# Patient Record
Sex: FEMALE | Race: WHITE | NOT HISPANIC OR LATINO | Employment: OTHER | URBAN - METROPOLITAN AREA
[De-identification: names, ages, dates, MRNs, and addresses within clinical notes are randomized per-mention and may not be internally consistent; named-entity substitution may affect disease eponyms.]

---

## 2016-12-27 LAB — HM COLONOSCOPY: NORMAL

## 2017-01-12 ENCOUNTER — GENERIC CONVERSION - ENCOUNTER (OUTPATIENT)
Dept: OTHER | Facility: OTHER | Age: 72
End: 2017-01-12

## 2017-03-28 ENCOUNTER — GENERIC CONVERSION - ENCOUNTER (OUTPATIENT)
Dept: OTHER | Facility: OTHER | Age: 72
End: 2017-03-28

## 2017-07-05 ENCOUNTER — GENERIC CONVERSION - ENCOUNTER (OUTPATIENT)
Dept: OTHER | Facility: OTHER | Age: 72
End: 2017-07-05

## 2017-07-06 DIAGNOSIS — E04.1 NONTOXIC SINGLE THYROID NODULE: ICD-10-CM

## 2017-07-06 DIAGNOSIS — I10 ESSENTIAL (PRIMARY) HYPERTENSION: ICD-10-CM

## 2017-07-06 DIAGNOSIS — E78.5 HYPERLIPIDEMIA: ICD-10-CM

## 2017-07-18 ENCOUNTER — GENERIC CONVERSION - ENCOUNTER (OUTPATIENT)
Dept: OTHER | Facility: OTHER | Age: 72
End: 2017-07-18

## 2017-07-26 ENCOUNTER — ALLSCRIPTS OFFICE VISIT (OUTPATIENT)
Dept: OTHER | Facility: OTHER | Age: 72
End: 2017-07-26

## 2017-08-02 ENCOUNTER — GENERIC CONVERSION - ENCOUNTER (OUTPATIENT)
Dept: OTHER | Facility: OTHER | Age: 72
End: 2017-08-02

## 2017-08-08 ENCOUNTER — GENERIC CONVERSION - ENCOUNTER (OUTPATIENT)
Dept: OTHER | Facility: OTHER | Age: 72
End: 2017-08-08

## 2017-08-08 LAB — HM MAMMOGRAM: NORMAL

## 2017-10-23 ENCOUNTER — GENERIC CONVERSION - ENCOUNTER (OUTPATIENT)
Dept: OTHER | Facility: OTHER | Age: 72
End: 2017-10-23

## 2018-01-14 VITALS
SYSTOLIC BLOOD PRESSURE: 170 MMHG | DIASTOLIC BLOOD PRESSURE: 90 MMHG | WEIGHT: 180 LBS | RESPIRATION RATE: 16 BRPM | HEIGHT: 60 IN | BODY MASS INDEX: 35.34 KG/M2 | OXYGEN SATURATION: 98 % | HEART RATE: 90 BPM

## 2018-01-18 NOTE — RESULT NOTES
Verified Results  (1) TSH [id number] 12:00AM Shantell Oconnellselene   [facility]     Test Name Result Flag Reference   TSH 1 820       (1) T4, FREE [id number] 12:00AM Kailey Soares 16     Test Name Result Flag Reference   T4,FREE 1 13

## 2018-01-24 DIAGNOSIS — K21.9 GASTROESOPHAGEAL REFLUX DISEASE, ESOPHAGITIS PRESENCE NOT SPECIFIED: Primary | ICD-10-CM

## 2018-01-24 RX ORDER — OMEPRAZOLE 20 MG/1
20 CAPSULE, DELAYED RELEASE ORAL DAILY
Qty: 30 CAPSULE | Refills: 2 | Status: SHIPPED | OUTPATIENT
Start: 2018-01-24 | End: 2018-01-29 | Stop reason: SDUPTHER

## 2018-01-29 DIAGNOSIS — K21.9 GASTROESOPHAGEAL REFLUX DISEASE, ESOPHAGITIS PRESENCE NOT SPECIFIED: ICD-10-CM

## 2018-01-29 RX ORDER — OMEPRAZOLE 20 MG/1
20 CAPSULE, DELAYED RELEASE ORAL DAILY
Qty: 30 CAPSULE | Refills: 0 | Status: SHIPPED | OUTPATIENT
Start: 2018-01-29 | End: 2018-02-28 | Stop reason: SDUPTHER

## 2018-02-27 DIAGNOSIS — K21.9 GASTROESOPHAGEAL REFLUX DISEASE, ESOPHAGITIS PRESENCE NOT SPECIFIED: ICD-10-CM

## 2018-02-28 DIAGNOSIS — I10 HYPERTENSION, UNSPECIFIED TYPE: Primary | ICD-10-CM

## 2018-02-28 DIAGNOSIS — E78.2 MIXED HYPERLIPIDEMIA: ICD-10-CM

## 2018-02-28 DIAGNOSIS — E04.1 THYROID NODULE: ICD-10-CM

## 2018-02-28 DIAGNOSIS — K21.9 GASTROESOPHAGEAL REFLUX DISEASE, ESOPHAGITIS PRESENCE NOT SPECIFIED: ICD-10-CM

## 2018-02-28 RX ORDER — OMEPRAZOLE 20 MG/1
20 CAPSULE, DELAYED RELEASE ORAL DAILY
Qty: 30 CAPSULE | Refills: 0 | Status: SHIPPED | OUTPATIENT
Start: 2018-02-28 | End: 2018-03-06 | Stop reason: SDUPTHER

## 2018-02-28 RX ORDER — OMEPRAZOLE 20 MG/1
CAPSULE, DELAYED RELEASE ORAL
Qty: 30 CAPSULE | Refills: 0 | OUTPATIENT
Start: 2018-02-28

## 2018-02-28 NOTE — TELEPHONE ENCOUNTER
Approve--30day supply    Labs way overdue--labs into [facility] pt is aware no refills until these are done--was told to pt 07/2017, still not done

## 2018-03-06 DIAGNOSIS — K21.9 GASTROESOPHAGEAL REFLUX DISEASE, ESOPHAGITIS PRESENCE NOT SPECIFIED: ICD-10-CM

## 2018-03-06 RX ORDER — OMEPRAZOLE 20 MG/1
20 CAPSULE, DELAYED RELEASE ORAL DAILY
Qty: 90 CAPSULE | Refills: 1 | Status: SHIPPED | OUTPATIENT
Start: 2018-03-06 | End: 2018-07-30 | Stop reason: SDUPTHER

## 2018-03-10 PROBLEM — I44.7 LEFT BUNDLE-BRANCH BLOCK: Status: ACTIVE | Noted: 2017-07-26

## 2018-03-13 ENCOUNTER — OFFICE VISIT (OUTPATIENT)
Dept: FAMILY MEDICINE CLINIC | Facility: CLINIC | Age: 73
End: 2018-03-13
Payer: MEDICARE

## 2018-03-13 VITALS
HEIGHT: 60 IN | SYSTOLIC BLOOD PRESSURE: 140 MMHG | OXYGEN SATURATION: 97 % | BODY MASS INDEX: 35.14 KG/M2 | HEART RATE: 74 BPM | DIASTOLIC BLOOD PRESSURE: 70 MMHG | WEIGHT: 179 LBS | RESPIRATION RATE: 16 BRPM

## 2018-03-13 DIAGNOSIS — K21.9 GASTROESOPHAGEAL REFLUX DISEASE WITHOUT ESOPHAGITIS: Primary | ICD-10-CM

## 2018-03-13 DIAGNOSIS — J30.89 CHRONIC NONSEASONAL ALLERGIC RHINITIS DUE TO POLLEN: ICD-10-CM

## 2018-03-13 DIAGNOSIS — I10 ESSENTIAL HYPERTENSION: ICD-10-CM

## 2018-03-13 DIAGNOSIS — E78.5 MILD HYPERLIPIDEMIA: ICD-10-CM

## 2018-03-13 PROCEDURE — 99214 OFFICE O/P EST MOD 30 MIN: CPT | Performed by: FAMILY MEDICINE

## 2018-03-13 RX ORDER — HYDROCHLOROTHIAZIDE 12.5 MG/1
12.5 TABLET ORAL DAILY
COMMUNITY
Start: 2018-01-05 | End: 2020-05-08 | Stop reason: ALTCHOICE

## 2018-03-13 NOTE — PATIENT INSTRUCTIONS
Continue current medications  No refills are needed to be sent today  Blood work on cholesterol should be 1 year after the last   Follow up with Endocrinology and Cardiology as they advise  Treat episodic heartburn or GERD symptoms with antacids as we discussed  If this becomes more persistent we could add Zantac or Pepcid in the evening

## 2018-03-13 NOTE — PROGRESS NOTES
Assessment/Plan:    No problem-specific Assessment & Plan notes found for this encounter  Diagnoses and all orders for this visit:    Gastroesophageal reflux disease without esophagitis    Chronic nonseasonal allergic rhinitis due to pollen    Essential hypertension    Mild hyperlipidemia    Other orders  -     aspirin 81 MG tablet; Take 1 tablet by mouth daily  -     hydrochlorothiazide (HYDRODIURIL) 12 5 mg tablet;         Patient Instructions   Continue current medications  No refills are needed to be sent today  Blood work on cholesterol should be 1 year after the last   Follow up with Endocrinology and Cardiology as they advise  Treat episodic heartburn or GERD symptoms with antacids as we discussed  If this becomes more persistent we could add Zantac or Pepcid in the evening  Subjective:   Chief Complaint   Patient presents with    Follow-up     mm - review labs, reflux issues           Patient ID: Candy Baron is a 67 y o  female  Medical management  1) GERD- some episodic symptoms- usually resolved with prn antacids- continues with Omeprazole in the AM  2)HTN/LBBB- sees cardiology who manages meds  3) Thyroid nodule- being followed by Dr Benjamin Scheuermann  4) Hyperlipidemia- stable with current meds- no med side effects        The following portions of the patient's history were reviewed and updated as appropriate: allergies, current medications, past family history, past medical history, past social history and problem list     Review of Systems   Constitutional: Negative for activity change, appetite change, diaphoresis and fatigue  Respiratory: Negative for cough, chest tightness, shortness of breath and wheezing  Cardiovascular: Negative for chest pain, palpitations and leg swelling  Fast or slow heart rate   Gastrointestinal: Negative for abdominal pain, blood in stool, constipation, diarrhea, nausea and vomiting     Genitourinary: Negative for difficulty urinating, dysuria and frequency  Musculoskeletal: Negative for arthralgias, gait problem, joint swelling and myalgias  Neurological: Negative for dizziness, weakness, light-headedness, numbness and headaches  Psychiatric/Behavioral: Negative for agitation, confusion, dysphoric mood and sleep disturbance  The patient is not nervous/anxious  Objective:      /70 (BP Location: Left arm, Patient Position: Sitting, Cuff Size: Standard)   Pulse 74   Resp 16   Ht 5' (1 524 m)   Wt 81 2 kg (179 lb)   SpO2 97%   BMI 34 96 kg/m²          Physical Exam   Constitutional: She is oriented to person, place, and time  She appears well-developed and well-nourished  No distress  HENT:   Head: Normocephalic and atraumatic  Right Ear: Tympanic membrane, external ear and ear canal normal    Left Ear: Tympanic membrane, external ear and ear canal normal    Nose: No mucosal edema or rhinorrhea  Right sinus exhibits no maxillary sinus tenderness  Left sinus exhibits no maxillary sinus tenderness  Mouth/Throat: Uvula is midline  Mucous membranes are not pale and not dry  Normal dentition  No oropharyngeal exudate  Eyes: EOM are normal  Pupils are equal, round, and reactive to light  Right eye exhibits no discharge  Left eye exhibits no discharge  Neck: Normal range of motion  Neck supple  No thyromegaly present  Cardiovascular: Normal rate, regular rhythm, normal heart sounds and intact distal pulses  No murmur heard  Pulmonary/Chest: Effort normal and breath sounds normal  No respiratory distress  She has no wheezes  She has no rales  Musculoskeletal: Normal range of motion  She exhibits no edema or tenderness  Lymphadenopathy:     She has no cervical adenopathy  Neurological: She is alert and oriented to person, place, and time  No cranial nerve deficit  Skin: She is not diaphoretic  Psychiatric: She has a normal mood and affect   Her behavior is normal

## 2018-05-04 ENCOUNTER — TELEPHONE (OUTPATIENT)
Dept: ENDOCRINOLOGY | Facility: HOSPITAL | Age: 73
End: 2018-05-04

## 2018-05-04 ENCOUNTER — OFFICE VISIT (OUTPATIENT)
Dept: ENDOCRINOLOGY | Facility: HOSPITAL | Age: 73
End: 2018-05-04
Payer: MEDICARE

## 2018-05-04 VITALS
HEART RATE: 78 BPM | DIASTOLIC BLOOD PRESSURE: 66 MMHG | HEIGHT: 60 IN | BODY MASS INDEX: 35.34 KG/M2 | WEIGHT: 180 LBS | SYSTOLIC BLOOD PRESSURE: 120 MMHG

## 2018-05-04 DIAGNOSIS — E04.2 GOITER, NONTOXIC, MULTINODULAR: ICD-10-CM

## 2018-05-04 DIAGNOSIS — E04.1 THYROID NODULE: Primary | ICD-10-CM

## 2018-05-04 PROCEDURE — 99213 OFFICE O/P EST LOW 20 MIN: CPT | Performed by: INTERNAL MEDICINE

## 2018-05-04 NOTE — LETTER
May 4, 2018     Rodríguez Pal MD  [facility]    Patient: Luis Billings   YOB: 1945   Date of Visit: 5/4/2018       Dear Dr Eunice Dalal:    Thank you for referring Cici Hwang to me for evaluation  Below are my notes for this consultation  If you have questions, please do not hesitate to call me  I look forward to following your patient along with you  Sincerely,        Azam Villa MD        CC: No Recipients  Azam Villa MD  5/4/2018 11:41 AM  Sign at close encounter  5/4/2018    Assessment/Plan      {Assess/PlanSmartLinks:69668}    Assessment/Plan:  ***      CC: ***    History of Present Illness     HPI: Luis Billings is a 67y o  year old female with history of ***    Review of Systems   Constitutional: Negative for fatigue and unexpected weight change  Weight a few lbs less  HENT: Negative for ear pain, hearing loss, tinnitus and trouble swallowing  No XRT to head or neck  Eyes: Negative for visual disturbance  No diplopia  Respiratory: Negative for chest tightness and shortness of breath  Cardiovascular: Negative for chest pain, palpitations and leg swelling  Gastrointestinal: Positive for diarrhea  Negative for abdominal pain, constipation and nausea  Occasional diarrhea  Endocrine: Negative for cold intolerance and heat intolerance  Musculoskeletal: Positive for arthralgias  Negative for back pain  Bilateral knee pain  Still some left knee stiffness  Skin: Negative for rash and wound  No dry skin or hair loss, but has brittle nails at times   Neurological: Negative for dizziness, tremors, light-headedness, numbness and headaches  Numbness left arm in am after sleep on it  Psychiatric/Behavioral: Negative for dysphoric mood and sleep disturbance  The patient is not nervous/anxious          Historical Information   Past Medical History:   Diagnosis Date    Cataract     OS    GERD (gastroesophageal reflux disease)     Hyperlipidemia     Hypertension     Seasonal allergies      Past Surgical History:   Procedure Laterality Date    APPENDECTOMY      ASPIRATION BIOPSY      thyroid    CATARACT EXTRACTION W/  INTRAOCULAR LENS IMPLANT Right     CHOLECYSTECTOMY      HYSTERECTOMY      total    WI REMV CATARACT EXTRACAP,INSERT LENS Left 10/19/2016    Procedure: OS EXTRACTION EXTRACAPSULAR CATARACT PHACO INTRAOCULAR LENS (IOL); Surgeon: Nilo Zapien MD;  Location:  MAIN OR;  Service: Ophthalmology    TOTAL KNEE ARTHROPLASTY Left     VAGINAL DELIVERY      X 4     Social History   History   Alcohol Use    Yes     Comment: twice monthly     History   Drug Use No     History   Smoking Status    Never Smoker   Smokeless Tobacco    Never Used     Family History:   Family History   Problem Relation Age of Onset    Coronary artery disease Mother     Other Mother      arterioloscleroses    Tuberculosis Father     Cancer Sister     Early death Sister     Cancer Brother     Early death Brother     Colon cancer Brother     Drug abuse Grandchild     Alcohol abuse Son     Drug abuse Cousin        Meds/Allergies   Current Outpatient Prescriptions   Medication Sig Dispense Refill    aspirin 81 MG tablet Take 1 tablet by mouth daily      Cholecalciferol (VITAMIN D3) 1000 UNITS CAPS Take by mouth      hydrochlorothiazide (HYDRODIURIL) 12 5 mg tablet       lisinopril (ZESTRIL) 10 mg tablet Take 10 mg by mouth daily 10mg in AM 20mg at night       loratadine (CLARITIN) 10 mg tablet Take 10 mg by mouth daily      Omega-3 Fatty Acids (OMEGA 3 PO) Take 350 mg by mouth daily      omeprazole (PriLOSEC) 20 mg delayed release capsule Take 1 capsule (20 mg total) by mouth daily 90 capsule 1    simvastatin (ZOCOR) 20 mg tablet Take 20 mg by mouth daily at bedtime       No current facility-administered medications for this visit        No Known Allergies    Objective   Vitals: Blood pressure 120/66, pulse 78, height 5' (1 524 m), weight 81 6 kg (180 lb)  Invasive Devices          No matching active lines, drains, or airways          Physical Exam   Constitutional: She is oriented to person, place, and time  She appears well-developed and well-nourished  HENT:   Head: Normocephalic and atraumatic  Eyes: Conjunctivae and EOM are normal  Pupils are equal, round, and reactive to light  Neck: Normal range of motion  Neck supple  No thyromegaly present  Cardiovascular: Normal rate, regular rhythm, normal heart sounds and intact distal pulses  No murmur heard  Pulmonary/Chest: Effort normal and breath sounds normal  She has no wheezes  Abdominal: Soft  Bowel sounds are normal  There is no tenderness  Musculoskeletal: Normal range of motion  She exhibits no edema or deformity  Lymphadenopathy:     She has no cervical adenopathy  Neurological: She is alert and oriented to person, place, and time  She has normal reflexes  Skin: Skin is warm and dry  No rash noted  Vitals reviewed  The history was obtained from the review of the chart and from the {BBPTFA:76850}  Lab Results:        Lab Results   Component Value Date    CHOL 180 08/14/2015    HDL 69 08/14/2015    TRIG 137 08/14/2015           Recent Results (from the past 49002 hour(s))    Mammography    Collection Time: 08/08/17 12:00 AM   Result Value Ref Range     Mammogram [facility]          No future appointments

## 2018-05-04 NOTE — PATIENT INSTRUCTIONS
Thyroid blood work normal   We'll get ultrasound report and anthony you with results  Follow up in 1 year with blood work

## 2018-05-04 NOTE — LETTER
May 4, 2018     Rodríguez Pal MD  [facility]    Patient: Luis Billings   YOB: 1945   Date of Visit: 5/4/2018       Dear Dr Eunice Dalal:    Thank you for referring iCci Hwang to me for evaluation  Below are my notes for this consultation  If you have questions, please do not hesitate to call me  I look forward to following your patient along with you  Sincerely,        Azam Villa MD        CC: No Recipients  Azam Villa MD  5/4/2018 12:32 PM  Sign at close encounter  5/4/2018    Assessment/Plan      Diagnoses and all orders for this visit:    Thyroid nodule  -     Thyroid Antibodies Panel Lab Collect; Future  -     TSH, 3rd generation Lab Collect; Future    Goiter, nontoxic, multinodular  -     Thyroid Antibodies Panel Lab Collect; Future  -     TSH, 3rd generation Lab Collect; Future        Assessment/Plan:  1  Multinodular goiter  Most recent thyroid tests show her TSH is normal   She is biochemically and clinically euthyroid  I will follow this level over time  2   Left-sided dominant thyroid nodule  This nodule had previously been biopsied and was benign  It is actually decreased in size since last year  We will follow this with serial ultrasounds and likely repeat an ultrasound in about 2 years  I will see her in 1 year with preceding TSH and thyroid antibodies  CC: thyroid consult    History of Present Illness     HPI: Luis Billings is a 67y o  year old female with history of nontoxic multinodular goiter with left-sided dominant thyroid nodule biopsy benign in September of 2016  These nodules were originally found to on routine physical exam when her thyroid was felt to be abnormal in 2016 an ultrasound showed the nodules  She is on no medications at this time  She denies heat or cold intolerance  She denies constipation, palpitation, tremors, insomnia, or anxiety  She has some intermittent diarrhea certain foods    She has no fatigue  She has lost about 5 lb since last year  She has brittle nails at times, but no dry skin or hair loss  She has no diplopia  She has no compressive thyroid symptoms  She has no history of head or neck irradiation  Review of Systems   Constitutional: Negative for fatigue and unexpected weight change  Weight a few lbs less  HENT: Negative for ear pain, hearing loss, tinnitus and trouble swallowing  No XRT to head or neck  Eyes: Negative for visual disturbance  No diplopia  Respiratory: Negative for chest tightness and shortness of breath  Cardiovascular: Negative for chest pain, palpitations and leg swelling  Gastrointestinal: Positive for diarrhea  Negative for abdominal pain, constipation and nausea  Occasional diarrhea  Endocrine: Negative for cold intolerance and heat intolerance  Musculoskeletal: Positive for arthralgias  Negative for back pain  Bilateral knee pain  Still some left knee stiffness  Skin: Negative for rash and wound  No dry skin or hair loss, but has brittle nails at times   Neurological: Negative for dizziness, tremors, light-headedness, numbness and headaches  Numbness left arm in am after sleep on it  Psychiatric/Behavioral: Negative for dysphoric mood and sleep disturbance  The patient is not nervous/anxious  Historical Information   Past Medical History:   Diagnosis Date    Cataract     OS    GERD (gastroesophageal reflux disease)     Hyperlipidemia     Hypertension     Seasonal allergies      Past Surgical History:   Procedure Laterality Date    APPENDECTOMY      ASPIRATION BIOPSY      thyroid    CATARACT EXTRACTION W/  INTRAOCULAR LENS IMPLANT Right     CHOLECYSTECTOMY      HYSTERECTOMY      total    TX REMV CATARACT EXTRACAP,INSERT LENS Left 10/19/2016    Procedure: OS EXTRACTION EXTRACAPSULAR CATARACT PHACO INTRAOCULAR LENS (IOL);   Surgeon: Jurgen Scott MD;  Location:  MAIN OR; Service: Ophthalmology    TOTAL KNEE ARTHROPLASTY Left     VAGINAL DELIVERY      X 4     Social History   History   Alcohol Use    Yes     Comment: twice monthly     History   Drug Use No     History   Smoking Status    Never Smoker   Smokeless Tobacco    Never Used     Family History:   Family History   Problem Relation Age of Onset    Coronary artery disease Mother     Other Mother      arterioloscleroses    Tuberculosis Father     Cancer Sister     Early death Sister     Colon cancer Sister     Cancer Brother     Early death Brother     Colon cancer Brother     Drug abuse Grandchild     Alcohol abuse Son     Drug abuse Cousin     No Known Problems Son     No Known Problems Daughter     No Known Problems Daughter        Meds/Allergies   Current Outpatient Prescriptions   Medication Sig Dispense Refill    aspirin 81 MG tablet Take 1 tablet by mouth daily      Cholecalciferol (VITAMIN D3) 1000 UNITS CAPS Take by mouth daily        hydrochlorothiazide (HYDRODIURIL) 12 5 mg tablet Take 12 5 mg by mouth daily        lisinopril (ZESTRIL) 10 mg tablet Take 10 mg by mouth daily 20mg in AM 10mg at night       loratadine (CLARITIN) 10 mg tablet Take 10 mg by mouth daily      Omega-3 Fatty Acids (OMEGA 3 PO) Take 350 mg by mouth daily      omeprazole (PriLOSEC) 20 mg delayed release capsule Take 1 capsule (20 mg total) by mouth daily 90 capsule 1    simvastatin (ZOCOR) 20 mg tablet Take 20 mg by mouth daily at bedtime       No current facility-administered medications for this visit  No Known Allergies    Objective   Vitals: Blood pressure 120/66, pulse 78, height 5' (1 524 m), weight 81 6 kg (180 lb)  Invasive Devices          No matching active lines, drains, or airways          Physical Exam   Constitutional: She is oriented to person, place, and time  She appears well-developed and well-nourished  HENT:   Head: Normocephalic and atraumatic     Eyes: Conjunctivae and EOM are normal  Pupils are equal, round, and reactive to light  No lid lag, stare, proptosis, or periorbital edema  Neck: Normal range of motion  Neck supple  No thyromegaly present  Thyroid irregular in feel without discrete nodule palpable  No carotid bruits  Cardiovascular: Normal rate, regular rhythm, normal heart sounds and intact distal pulses  No murmur heard  Pulmonary/Chest: Effort normal and breath sounds normal  She has no wheezes  Abdominal: Soft  Bowel sounds are normal  There is no tenderness  Musculoskeletal: Normal range of motion  She exhibits edema  She exhibits no deformity  No tremor of the outstretched hands  No spinous process tenderness  No CVAT  Trace edema of the left lower leg  Lymphadenopathy:     She has no cervical adenopathy  Neurological: She is alert and oriented to person, place, and time  She has normal reflexes  Skin: Skin is warm and dry  No rash noted  Vitals reviewed  The history was obtained from the review of the chart and from the patient  Lab Results:    Blood work done on 03/28/2018 at [address] [place] [address] showed a TSH of 2 02  Thyroid ultrasound done on 03/02/2018 done at [address] [place] [address] showed a right lobe of the thyroid 4 1 cm in height and the left lobe of the thyroid 3 8 cm in height  This ultrasound was compared to a previous ultrasound on March 17, 2017  There was a posterior peripheral lower pole right thyroid hypoechoic nodule at 1 cm that is a mm larger  There is a 9 mm nodule in the isthmus that is 1 mm smaller  There is an anterior midpole left nodule 6 mm unchanged in size  There is a lower pole left thyroid nodule 1 2 cm and isoechoic which is diminished in size from 2 2 cm      Lab Results   Component Value Date    CHOL 180 08/14/2015    HDL 69 08/14/2015    TRIG 137 08/14/2015           Recent Results (from the past 53094 hour(s))    Mammography    Collection Time: 08/08/17 12:00 AM   Result Value Ref Range     Mammogram [facility] Future Appointments  Date Time Provider Caitlin Ross   5/7/2019 10:00 AM Chyna Mayo MD [facility]

## 2018-05-04 NOTE — PROGRESS NOTES
5/4/2018    Assessment/Plan      Diagnoses and all orders for this visit:    Thyroid nodule  -     Thyroid Antibodies Panel Lab Collect; Future  -     TSH, 3rd generation Lab Collect; Future    Goiter, nontoxic, multinodular  -     Thyroid Antibodies Panel Lab Collect; Future  -     TSH, 3rd generation Lab Collect; Future        Assessment/Plan:  1  Multinodular goiter  Most recent thyroid tests show her TSH is normal   She is biochemically and clinically euthyroid  I will follow this level over time  2   Left-sided dominant thyroid nodule  This nodule had previously been biopsied and was benign  It is actually decreased in size since last year  We will follow this with serial ultrasounds and likely repeat an ultrasound in about 2 years  I will see her in 1 year with preceding TSH and thyroid antibodies  CC: thyroid consult    History of Present Illness     HPI: Aurelio Teran is a 67y o  year old female with history of nontoxic multinodular goiter with left-sided dominant thyroid nodule biopsy benign in September of 2016  These nodules were originally found to on routine physical exam when her thyroid was felt to be abnormal in 2016 an ultrasound showed the nodules  She is on no medications at this time  She denies heat or cold intolerance  She denies constipation, palpitation, tremors, insomnia, or anxiety  She has some intermittent diarrhea certain foods  She has no fatigue  She has lost about 5 lb since last year  She has brittle nails at times, but no dry skin or hair loss  She has no diplopia  She has no compressive thyroid symptoms  She has no history of head or neck irradiation  Review of Systems   Constitutional: Negative for fatigue and unexpected weight change  Weight a few lbs less  HENT: Negative for ear pain, hearing loss, tinnitus and trouble swallowing  No XRT to head or neck  Eyes: Negative for visual disturbance  No diplopia     Respiratory: Negative for chest tightness and shortness of breath  Cardiovascular: Negative for chest pain, palpitations and leg swelling  Gastrointestinal: Positive for diarrhea  Negative for abdominal pain, constipation and nausea  Occasional diarrhea  Endocrine: Negative for cold intolerance and heat intolerance  Musculoskeletal: Positive for arthralgias  Negative for back pain  Bilateral knee pain  Still some left knee stiffness  Skin: Negative for rash and wound  No dry skin or hair loss, but has brittle nails at times   Neurological: Negative for dizziness, tremors, light-headedness, numbness and headaches  Numbness left arm in am after sleep on it  Psychiatric/Behavioral: Negative for dysphoric mood and sleep disturbance  The patient is not nervous/anxious  Historical Information   Past Medical History:   Diagnosis Date    Cataract     OS    GERD (gastroesophageal reflux disease)     Hyperlipidemia     Hypertension     Seasonal allergies      Past Surgical History:   Procedure Laterality Date    APPENDECTOMY      ASPIRATION BIOPSY      thyroid    CATARACT EXTRACTION W/  INTRAOCULAR LENS IMPLANT Right     CHOLECYSTECTOMY      HYSTERECTOMY      total    MD REMV CATARACT EXTRACAP,INSERT LENS Left 10/19/2016    Procedure: OS EXTRACTION EXTRACAPSULAR CATARACT PHACO INTRAOCULAR LENS (IOL);   Surgeon: Fortino Chopra MD;  Location: [facility] OR;  Service: Ophthalmology    TOTAL KNEE ARTHROPLASTY Left     VAGINAL DELIVERY      X 4     Social History   History   Alcohol Use    Yes     Comment: twice monthly     History   Drug Use No     History   Smoking Status    Never Smoker   Smokeless Tobacco    Never Used     Family History:   Family History   Problem Relation Age of Onset    Coronary artery disease Mother     Other Mother      arterioloscleroses    Tuberculosis Father     Cancer Sister     Early death Sister     Colon cancer Sister     Cancer Brother     Early death Brother     Colon cancer Brother     Drug abuse Grandchild     Alcohol abuse Son     Drug abuse Cousin     No Known Problems Son     No Known Problems Daughter     No Known Problems Daughter        Meds/Allergies   Current Outpatient Prescriptions   Medication Sig Dispense Refill    aspirin 81 MG tablet Take 1 tablet by mouth daily      Cholecalciferol (VITAMIN D3) 1000 UNITS CAPS Take by mouth daily        hydrochlorothiazide (HYDRODIURIL) 12 5 mg tablet Take 12 5 mg by mouth daily        lisinopril (ZESTRIL) 10 mg tablet Take 10 mg by mouth daily 20mg in AM 10mg at night       loratadine (CLARITIN) 10 mg tablet Take 10 mg by mouth daily      Omega-3 Fatty Acids (OMEGA 3 PO) Take 350 mg by mouth daily      omeprazole (PriLOSEC) 20 mg delayed release capsule Take 1 capsule (20 mg total) by mouth daily 90 capsule 1    simvastatin (ZOCOR) 20 mg tablet Take 20 mg by mouth daily at bedtime       No current facility-administered medications for this visit  No Known Allergies    Objective   Vitals: Blood pressure 120/66, pulse 78, height 5' (1 524 m), weight 81 6 kg (180 lb)  Invasive Devices          No matching active lines, drains, or airways          Physical Exam   Constitutional: She is oriented to person, place, and time  She appears well-developed and well-nourished  HENT:   Head: Normocephalic and atraumatic  Eyes: Conjunctivae and EOM are normal  Pupils are equal, round, and reactive to light  No lid lag, stare, proptosis, or periorbital edema  Neck: Normal range of motion  Neck supple  No thyromegaly present  Thyroid irregular in feel without discrete nodule palpable  No carotid bruits  Cardiovascular: Normal rate, regular rhythm, normal heart sounds and intact distal pulses  No murmur heard  Pulmonary/Chest: Effort normal and breath sounds normal  She has no wheezes  Abdominal: Soft  Bowel sounds are normal  There is no tenderness     Musculoskeletal: Normal range of motion  She exhibits edema  She exhibits no deformity  No tremor of the outstretched hands  No spinous process tenderness  No CVAT  Trace edema of the left lower leg  Lymphadenopathy:     She has no cervical adenopathy  Neurological: She is alert and oriented to person, place, and time  She has normal reflexes  Skin: Skin is warm and dry  No rash noted  Vitals reviewed  The history was obtained from the review of the chart and from the patient  Lab Results:    Blood work done on 03/28/2018 at [facility] showed a TSH of 2 02  Thyroid ultrasound done on 03/02/2018 done at [facility] showed a right lobe of the thyroid 4 1 cm in height and the left lobe of the thyroid 3 8 cm in height  This ultrasound was compared to a previous ultrasound on March 17, 2017  There was a posterior peripheral lower pole right thyroid hypoechoic nodule at 1 cm that is a mm larger  There is a 9 mm nodule in the isthmus that is 1 mm smaller  There is an anterior midpole left nodule 6 mm unchanged in size  There is a lower pole left thyroid nodule 1 2 cm and isoechoic which is diminished in size from 2 2 cm      Lab Results   Component Value Date    CHOL 180 08/14/2015    HDL 69 08/14/2015    TRIG 137 08/14/2015           Recent Results (from the past 88695 hour(s))    Mammography    Collection Time: 08/08/17 12:00 AM   Result Value Ref Range     Mammogram [facility]          Future Appointments  Date Time Provider Caitlin Ross   5/7/2019 10:00 AM Adair Godinez MD [facility]

## 2018-07-30 DIAGNOSIS — K21.9 GASTROESOPHAGEAL REFLUX DISEASE, ESOPHAGITIS PRESENCE NOT SPECIFIED: ICD-10-CM

## 2018-07-31 RX ORDER — OMEPRAZOLE 20 MG/1
20 CAPSULE, DELAYED RELEASE ORAL DAILY
Qty: 90 CAPSULE | Refills: 1 | Status: SHIPPED | OUTPATIENT
Start: 2018-07-31 | End: 2019-03-07 | Stop reason: ALTCHOICE

## 2018-08-14 DIAGNOSIS — E78.5 MILD HYPERLIPIDEMIA: Primary | ICD-10-CM

## 2018-08-15 RX ORDER — SIMVASTATIN 20 MG
TABLET ORAL
Qty: 90 TABLET | Refills: 2 | Status: SHIPPED | OUTPATIENT
Start: 2018-08-15 | End: 2019-03-07 | Stop reason: ALTCHOICE

## 2018-08-15 NOTE — TELEPHONE ENCOUNTER
PLEASE APPROVE    DUE 6M OV F/U 9/2018 - CALLED AND SPOKE TO TO, SHE WANTS APT BEFORE SEPT    OV SCHED 8/22

## 2018-08-22 ENCOUNTER — OFFICE VISIT (OUTPATIENT)
Dept: FAMILY MEDICINE CLINIC | Facility: CLINIC | Age: 73
End: 2018-08-22
Payer: MEDICARE

## 2018-08-22 VITALS
WEIGHT: 173 LBS | SYSTOLIC BLOOD PRESSURE: 130 MMHG | OXYGEN SATURATION: 96 % | BODY MASS INDEX: 33.96 KG/M2 | HEIGHT: 60 IN | RESPIRATION RATE: 16 BRPM | HEART RATE: 80 BPM | DIASTOLIC BLOOD PRESSURE: 80 MMHG

## 2018-08-22 DIAGNOSIS — R13.14 PHARYNGOESOPHAGEAL DYSPHAGIA: ICD-10-CM

## 2018-08-22 DIAGNOSIS — E78.5 MILD HYPERLIPIDEMIA: ICD-10-CM

## 2018-08-22 DIAGNOSIS — Z00.00 MEDICARE ANNUAL WELLNESS VISIT, SUBSEQUENT: Primary | ICD-10-CM

## 2018-08-22 DIAGNOSIS — M17.12 PRIMARY OSTEOARTHRITIS OF LEFT KNEE: ICD-10-CM

## 2018-08-22 DIAGNOSIS — I10 ESSENTIAL HYPERTENSION: ICD-10-CM

## 2018-08-22 DIAGNOSIS — Z23 FLU VACCINE NEED: ICD-10-CM

## 2018-08-22 DIAGNOSIS — Z11.59 ENCOUNTER FOR HEPATITIS C SCREENING TEST FOR LOW RISK PATIENT: ICD-10-CM

## 2018-08-22 DIAGNOSIS — K21.9 GASTROESOPHAGEAL REFLUX DISEASE WITHOUT ESOPHAGITIS: ICD-10-CM

## 2018-08-22 PROCEDURE — G0008 ADMIN INFLUENZA VIRUS VAC: HCPCS

## 2018-08-22 PROCEDURE — 90662 IIV NO PRSV INCREASED AG IM: CPT

## 2018-08-22 PROCEDURE — 99214 OFFICE O/P EST MOD 30 MIN: CPT | Performed by: FAMILY MEDICINE

## 2018-08-22 PROCEDURE — G0439 PPPS, SUBSEQ VISIT: HCPCS | Performed by: FAMILY MEDICINE

## 2018-08-22 NOTE — PROGRESS NOTES
Assessment/Plan:    Gastroesophageal reflux disease  Continue current meds, refer to GI for evaluation recent onset of dysphagia  Essential hypertension  Continue current meds-repeat labs 6 months  Mild hyperlipidemia  Stable on current medications-no side effect  Repeat lipid panel will be due early next year  Diagnoses and all orders for this visit:    Medicare annual wellness visit, subsequent    Encounter for hepatitis C screening test for low risk patient  -     Hepatitis C antibody; Future    Gastroesophageal reflux disease without esophagitis  -     Ambulatory referral to Gastroenterology; Future    Mild hyperlipidemia    Pharyngoesophageal dysphagia  -     Ambulatory referral to Gastroenterology; Future    Essential hypertension    Primary osteoarthritis of left knee    Flu vaccine need  -     influenza vaccine, [id number], high-dose, PF 0 5 mL, for patients 65 yr+ (FLUZONE HIGH-DOSE)        Medical management-continue current medications  I would refer you to GI for the swallowing disorder  Flu shot today  Other laboratories can be done next February March in be ordered by me or Endocrinology  Subjective:   Chief Complaint   Patient presents with    Follow-up     6m f/u    trouble swallowing        Medicare Wellness Visit     annual         Patient ID: Luis Billings is a 68 y o  female  Medical management-  1) GERD-patient had been stable with medications  Over the last 3-4 weeks she does note some trouble swallowing solid and particularly dry foods  They appear to get stuck in the lower pharyngeal upper esophagus area  She is able to clear this by taking fluids  Does not happen with fluids or yogurt and other softer foods  There is no vomiting reported  No coughing or shortness of breath  2) HTN- stable on meds  3) htperlipidemia-last levels were good  There were done 6 months ago  Tolerating current medications    4) hypothyroidism-last TSH was good, Dr Tyson monitors thyroid function  The following portions of the patient's history were reviewed and updated as appropriate: allergies, current medications, past family history, past medical history, past social history, past surgical history and problem list     Review of Systems   Constitutional: Negative for activity change, appetite change, diaphoresis and fatigue  Respiratory: Positive for choking  Negative for cough, chest tightness, shortness of breath and wheezing  Cardiovascular: Negative for chest pain, palpitations and leg swelling  Fast or slow heart rate   Gastrointestinal: Positive for vomiting  Negative for abdominal pain, blood in stool, constipation, diarrhea and nausea  Genitourinary: Negative for difficulty urinating, dysuria and frequency  Musculoskeletal: Positive for arthralgias  Negative for gait problem, joint swelling and myalgias  Neurological: Negative for dizziness, light-headedness, numbness and headaches  Psychiatric/Behavioral: Negative for agitation, confusion, dysphoric mood and sleep disturbance  The patient is not nervous/anxious  Objective:  Vitals:    08/22/18 0911   BP: 130/80   BP Location: Left arm   Patient Position: Sitting   Cuff Size: Standard   Pulse: 80   Resp: 16   SpO2: 96%   Weight: 78 5 kg (173 lb)   Height: 5' (1 524 m)      Physical Exam   Constitutional: She appears well-developed and well-nourished  No distress  HENT:   Head: Normocephalic and atraumatic  Right Ear: Tympanic membrane and external ear normal  No drainage  Left Ear: Tympanic membrane normal  There is drainage  Mouth/Throat: No oropharyngeal exudate  Eyes: Conjunctivae and EOM are normal  Right eye exhibits no discharge  Left eye exhibits no discharge  Neck: Normal range of motion  Neck supple  No thyromegaly present  Cardiovascular: Normal rate, regular rhythm and normal heart sounds  Pulmonary/Chest: Effort normal  No respiratory distress  She has no wheezes  She has no rales  Abdominal: Soft  She exhibits no mass  There is no tenderness  There is no rebound  Lymphadenopathy:     She has no cervical adenopathy  Skin: Skin is warm and dry  Psychiatric: She has a normal mood and affect   Her behavior is normal

## 2018-08-22 NOTE — PROGRESS NOTES
Assessment and Plan:    Problem List Items Addressed This Visit     Gastroesophageal reflux disease     Continue current meds, refer to GI for evaluation recent onset of dysphagia  Relevant Orders    Ambulatory referral to Gastroenterology    Essential hypertension     Continue current meds-repeat labs 6 months  Mild hyperlipidemia     Stable on current medications-no side effect  Repeat lipid panel will be due early next year  Primary osteoarthritis of left knee      Other Visit Diagnoses     Medicare annual wellness visit, subsequent    -  Primary    Encounter for hepatitis C screening test for low risk patient        Relevant Orders    Hepatitis C antibody    Pharyngoesophageal dysphagia        Relevant Orders    Ambulatory referral to Gastroenterology    Flu vaccine need        Relevant Orders    influenza vaccine, [id number], high-dose, PF 0 5 mL, for patients 65 yr+ (FLUZONE HIGH-DOSE) (Completed)        Health Maintenance Due   Topic Date Due    DXA SCAN  1945    DTaP,Tdap,and Td Vaccines (1 - Tdap) 07/27/1966    Urinary Incontinence Screening  07/27/2010         HPI:  Darin Hunt is a 68 y o  female here for her Subsequent Wellness Visit      Patient Active Problem List   Diagnosis    Allergic rhinitis due to pollen    Gastroesophageal reflux disease    Essential hypertension    Left bundle-branch block    Mild hyperlipidemia    Primary osteoarthritis of left knee    Thyroid nodule    Goiter, nontoxic, multinodular     Past Medical History:   Diagnosis Date    Cataract     OS    GERD (gastroesophageal reflux disease)     Hyperlipidemia     Hypertension     Seasonal allergies      Past Surgical History:   Procedure Laterality Date    APPENDECTOMY      ASPIRATION BIOPSY      thyroid    CATARACT EXTRACTION W/  INTRAOCULAR LENS IMPLANT Right     CHOLECYSTECTOMY      HYSTERECTOMY      total    ND REMV CATARACT EXTRACAP,INSERT LENS Left 10/19/2016    Procedure: OS EXTRACTION EXTRACAPSULAR CATARACT PHACO INTRAOCULAR LENS (IOL); Surgeon: Nilo Zapien MD;  Location: [facility];  Service: Ophthalmology    TOTAL KNEE ARTHROPLASTY Left     VAGINAL DELIVERY      X 4     Family History   Problem Relation Age of Onset    Coronary artery disease Mother     Other Mother         arterioloscleroses    Tuberculosis Father     Cancer Sister     Early death Sister     Colon cancer Sister     Cancer Brother     Early death Brother    Dequan Adan Colon cancer Brother     Drug abuse Grandchild     Alcohol abuse Son     Drug abuse Cousin     No Known Problems Son     No Known Problems Daughter     No Known Problems Daughter      History   Smoking Status    Never Smoker   Smokeless Tobacco    Never Used     History   Alcohol Use    Yes     Comment: twice monthly      History   Drug Use No       Current Outpatient Prescriptions   Medication Sig Dispense Refill    aspirin 81 MG tablet Take 1 tablet by mouth daily      Cholecalciferol (VITAMIN D3) 1000 UNITS CAPS Take by mouth daily        hydrochlorothiazide (HYDRODIURIL) 12 5 mg tablet Take 12 5 mg by mouth daily        lisinopril (ZESTRIL) 10 mg tablet Take 10 mg by mouth daily 20mg in AM 10mg at night       loratadine (CLARITIN) 10 mg tablet Take 10 mg by mouth daily      Omega-3 Fatty Acids (OMEGA 3 PO) Take 350 mg by mouth daily      omeprazole (PriLOSEC) 20 mg delayed release capsule TAKE 1 CAPSULE (20 MG TOTAL) BY MOUTH DAILY 90 capsule 1    simvastatin (ZOCOR) 20 mg tablet TAKE 1 TABLET EVERY DAY 90 tablet 2     No current facility-administered medications for this visit        No Known Allergies  Immunization History   Administered Date(s) Administered    Influenza 09/08/2014    Influenza Split High Dose Preservative Free IM 10/25/2016, 10/04/2017    Influenza, high dose seasonal 0 5 mL 08/22/2018    Pneumococcal Conjugate 13-Valent 03/12/2015    Pneumococcal Polysaccharide PPV23 08/28/2012       Patient Care Team:  Freida Arthur MD as PCP - Jose Juan Abel MD (Endocrinology)    Medicare Screening Tests and Risk Assessments:      Health Risk Assessment:  Patient rates overall health as very good  Patient feels that their physical health rating is Same  Eyesight was rated as Same  Hearing was rated as Same  Patient feels that their emotional and mental health rating is Same  Pain experienced by patient in the last 7 days has been None  Emotional/Mental Health:  Patient has been feeling nervous/anxious  PHQ-9 Depression Screening:    Frequency of the following problems over the past two weeks:      1  Little interest or pleasure in doing things: 0 - not at all      2  Feeling down, depressed, or hopeless: 0 - not at all  PHQ-2 Score: 0          Broken Bones/Falls: Fall Risk Assessment:    In the past year, patient has experienced: No history of falling in past year          Bladder/Bowel:  Patient has not leaked urine accidently in the last six months  Patient reports no loss of bowel control  Immunizations:  Patient has had a flu vaccination within the last year  Patient has received a pneumonia shot  Patient has not received a shingles shot  Patient has not received tetanus/diphtheria shot  Home Safety:  Patient does not have trouble with stairs inside or outside of their home  Patient currently reports that there are no safety hazards present in home, working smoke alarms, working carbon monoxide detectors  Preventative Screenings:   Breast cancer screening performed, 8/22/2017  colon cancer screen completed, 8/22/2015  cholesterol screen completed, glaucoma eye exam completed,     Nutrition:  Current diet: Regular and Limited junk food with servings of the following:    Medications:  Patient is currently taking over-the-counter supplements  Patient is able to manage medications  Lifestyle Choices:  Patient reports no tobacco use    Patient has not smoked or used tobacco in the past  Patient reports alcohol use  Alcohol use per week: 1  Patient drives a vehicle  Patient wears seat belt  Activities of Daily Living:  Can get out of bed by his or her self, able to dress self, able to make own meals, able to do own shopping, able to bathe self, can do own laundry/housekeeping, can manage own money, pay bills and track expenses    Previous Hospitalizations:  No hospitalization or ED visit in past 12 months        Advanced Directives:  Patient has decided on a power of   Patient has spoken to designated power of   Patient has completed advanced directive  Preventative Screening/Counseling:      Cardiovascular:      General: Screening Current          Diabetes:      General: Screening Current          Colorectal Cancer:      General: Screening Current          Breast Cancer:      General: Screening Current          Cervical Cancer:      General: Screening Not Indicated          Osteoporosis:      General: Risks and Benefits Discussed and Patient Declines      Comments: Advised by GYN not to have done at her age        AAA:      General: Screening Not Indicated          Glaucoma:      General: Screening Current          Hepatitis C:      General: Risks and Benefits Discussed      Counseling: has received general HCV counseling      Additional Comments: Ordered Hep C AB    Advanced Directives:   Patient has living will for healthcare, has durable POA for healthcare, patient has an advanced directive  End of life assessment reviewed with patient  Provider agrees with end of life decisions   Immunizations:      Influenza: Influenza Recommended Annually      Pneumococcal: Lifetime Vaccine Completed      Shingrix: Risks & Benefits Discussed      TDAP: Risks & Benefits Discussed  Additional Comments: Would need to get Shingrix and tetanus at pharmacy      Referrals:  Referral(s) to: Gynecology    Health maintenance-generally you up-to-date    Consider getting the shingles vaccine at the pharmacy  Also if she wanted update tetanus this would also need to be done at the pharmacy  Order for hepatitis C antibody screening provided  Depression screen negative  Cancer screens are current  GYN does not advise DEXA at current age

## 2018-08-22 NOTE — PATIENT INSTRUCTIONS
Health maintenance-generally you up-to-date  Consider getting the shingles vaccine at the pharmacy  Also if he wanted update tetanus this would also need to be done at the pharmacy  Order for hepatitis C antibody screening provided  Depression screen negative  Cancer screens are current  Medical management-continue current medications  I would refer you to GI for the swallowing disorder  Flu shot today  Other laboratories can be done next February March in be ordered by me or Endocrinology

## 2018-12-17 DIAGNOSIS — K21.9 GASTROESOPHAGEAL REFLUX DISEASE, ESOPHAGITIS PRESENCE NOT SPECIFIED: ICD-10-CM

## 2018-12-17 RX ORDER — OMEPRAZOLE 20 MG/1
CAPSULE, DELAYED RELEASE ORAL
Qty: 90 CAPSULE | Refills: 0 | OUTPATIENT
Start: 2018-12-17

## 2019-03-04 ENCOUNTER — TRANSITIONAL CARE MANAGEMENT (OUTPATIENT)
Dept: FAMILY MEDICINE CLINIC | Facility: CLINIC | Age: 74
End: 2019-03-04

## 2019-03-07 ENCOUNTER — OFFICE VISIT (OUTPATIENT)
Dept: FAMILY MEDICINE CLINIC | Facility: CLINIC | Age: 74
End: 2019-03-07
Payer: MEDICARE

## 2019-03-07 VITALS
SYSTOLIC BLOOD PRESSURE: 110 MMHG | HEART RATE: 62 BPM | RESPIRATION RATE: 14 BRPM | OXYGEN SATURATION: 99 % | WEIGHT: 139 LBS | HEIGHT: 60 IN | BODY MASS INDEX: 27.29 KG/M2 | DIASTOLIC BLOOD PRESSURE: 60 MMHG

## 2019-03-07 DIAGNOSIS — C15.5 MALIGNANT NEOPLASM OF LOWER THIRD OF ESOPHAGUS (HCC): ICD-10-CM

## 2019-03-07 DIAGNOSIS — E78.5 MILD HYPERLIPIDEMIA: ICD-10-CM

## 2019-03-07 DIAGNOSIS — I10 ESSENTIAL HYPERTENSION: ICD-10-CM

## 2019-03-07 DIAGNOSIS — K21.9 GASTROESOPHAGEAL REFLUX DISEASE WITHOUT ESOPHAGITIS: Primary | ICD-10-CM

## 2019-03-07 DIAGNOSIS — K21.9 GASTROESOPHAGEAL REFLUX DISEASE WITHOUT ESOPHAGITIS: ICD-10-CM

## 2019-03-07 PROCEDURE — 99214 OFFICE O/P EST MOD 30 MIN: CPT | Performed by: FAMILY MEDICINE

## 2019-03-07 RX ORDER — FLUCONAZOLE 100 MG/1
100 TABLET ORAL
COMMUNITY
Start: 2019-01-17 | End: 2019-03-07 | Stop reason: ALTCHOICE

## 2019-03-07 RX ORDER — RANITIDINE 150 MG/1
150 CAPSULE ORAL 2 TIMES DAILY
Qty: 60 CAPSULE | Refills: 5 | Status: SHIPPED | OUTPATIENT
Start: 2019-03-07 | End: 2019-03-07 | Stop reason: SDUPTHER

## 2019-03-07 RX ORDER — RANITIDINE 15 MG/ML
150 SOLUTION ORAL
COMMUNITY
Start: 2019-01-16 | End: 2019-05-03

## 2019-03-07 RX ORDER — SENNOSIDES 8.8 MG/5ML
17.6 LIQUID ORAL
COMMUNITY
Start: 2019-01-16 | End: 2019-03-07 | Stop reason: ALTCHOICE

## 2019-03-07 RX ORDER — OXYCODONE HCL 5 MG/5 ML
5 SOLUTION, ORAL ORAL
COMMUNITY
Start: 2019-01-16 | End: 2019-03-07 | Stop reason: ALTCHOICE

## 2019-03-07 RX ORDER — POLYETHYLENE GLYCOL 3350 17 G/17G
17 POWDER, FOR SOLUTION ORAL
COMMUNITY
Start: 2019-01-16 | End: 2019-03-07 | Stop reason: ALTCHOICE

## 2019-03-07 RX ORDER — RANITIDINE 150 MG/1
150 CAPSULE ORAL 2 TIMES DAILY
Qty: 60 CAPSULE | Refills: 5 | Status: SHIPPED | OUTPATIENT
Start: 2019-03-07 | End: 2019-03-11 | Stop reason: SDUPTHER

## 2019-03-07 RX ORDER — MAGNESIUM HYDROXIDE/ALUMINUM HYDROXICE/SIMETHICONE 120; 1200; 1200 MG/30ML; MG/30ML; MG/30ML
15 SUSPENSION ORAL
COMMUNITY
Start: 2019-01-18 | End: 2019-03-07 | Stop reason: ALTCHOICE

## 2019-03-07 RX ORDER — ATENOLOL 25 MG/1
25 TABLET ORAL
COMMUNITY
Start: 2019-02-19 | End: 2021-11-16 | Stop reason: ALTCHOICE

## 2019-03-07 RX ORDER — CHLORHEXIDINE GLUCONATE 0.12 MG/ML
5 RINSE ORAL
COMMUNITY
Start: 2019-01-16 | End: 2019-03-07 | Stop reason: ALTCHOICE

## 2019-03-07 RX ORDER — ACETAMINOPHEN 160 MG/5ML
650 SOLUTION ORAL
COMMUNITY
Start: 2019-01-16 | End: 2019-03-07 | Stop reason: ALTCHOICE

## 2019-03-07 RX ORDER — RANITIDINE 150 MG/1
150 CAPSULE ORAL 2 TIMES DAILY
Qty: 180 CAPSULE | Refills: 1 | Status: CANCELLED | OUTPATIENT
Start: 2019-03-07

## 2019-03-07 NOTE — PATIENT INSTRUCTIONS
Patient will follow up with Hematology-Oncology  Advanced diet as directed  I would wait approximately 3 months and check cholesterol off medications and decide whether to reinstitute therapy    She can also discuss this with cardiologist

## 2019-03-07 NOTE — PROGRESS NOTES
Subjective:   Chief Complaint   Patient presents with    Transition of Care Management     hospital f/u        Patient ID: Ellen Castellanos is a 68 y o  female  Patient comes back in today for follow-up for recent hospitalization in January for esophageal cancer  She has completed both chemotherapy and radiation therapy over the last 1 month  She is following up with Oncology with Dr Flower Green  She has begun to take soft food  TCM Call (since 2/4/2019)     Date and time call was made  3/4/2019  1:47 PM    Hospital care reviewed  Records reviewed    Patient was hospitialized at  Other (comment)    [address]    Date of Admission  01/14/19    Date of discharge  01/21/19    Diagnosis  ESOPHOGEAL CANCER    Disposition  Home    Were the patients medications reviewed and updated  Yes    Current Symptoms  None      TCM Call (since 2/4/2019)     Post hospital issues  None    Should patient be enrolled in anticoag monitoring? No    Scheduled for follow up? Yes    I have advised the patient to call PCP with any new or worsening symptoms  EDUARD AYALA MA    Living Arrangements  Children    Are you recieving home care services  Yes    Types of home care services  Nurse visit    Are you using any community resources  No    Current waiver services  No            The following portions of the patient's history were reviewed and updated as appropriate: allergies, current medications, past family history, past medical history, past social history, past surgical history and problem list     Review of Systems   Constitutional: Negative for appetite change, chills, diaphoresis and fever  HENT: Negative for ear pain, rhinorrhea, sinus pressure and sore throat  Eyes: Negative for discharge, redness and itching  Respiratory: Negative for cough, shortness of breath and wheezing  Cardiovascular: Negative for chest pain and palpitations          Rapid or slow heart rate   Gastrointestinal: Negative for abdominal pain, diarrhea, nausea and vomiting  Genitourinary: Negative for difficulty urinating, dysuria, frequency and urgency  Neurological: Negative for dizziness, light-headedness and headaches  Psychiatric/Behavioral: Negative for decreased concentration, dysphoric mood and sleep disturbance  The patient is not nervous/anxious  Objective:  Vitals:    03/07/19 1252   BP: 110/60   BP Location: Right arm   Patient Position: Sitting   Cuff Size: Standard   Pulse: 62   Resp: 14   SpO2: 99%   Weight: 60 3 kg (133 lb)   Height: 5' (1 524 m)      Physical Exam   Constitutional: She is oriented to person, place, and time  She appears well-developed and well-nourished  No distress  HENT:   Head: Normocephalic and atraumatic  Right Ear: Tympanic membrane and external ear normal  No drainage  Left Ear: Tympanic membrane normal  No drainage  Mouth/Throat: No oropharyngeal exudate  Eyes: Conjunctivae and EOM are normal  Right eye exhibits no discharge  Left eye exhibits no discharge  Neck: Normal range of motion  Neck supple  No thyromegaly present  Cardiovascular: Normal rate, regular rhythm and normal heart sounds  Pulmonary/Chest: Effort normal  No respiratory distress  She has no wheezes  She has no rales  Abdominal: Soft  There is no tenderness  Lymphadenopathy:     She has no cervical adenopathy  Neurological: She is alert and oriented to person, place, and time  Psychiatric: She has a normal mood and affect  Her behavior is normal          Assessment/Plan:    No problem-specific Assessment & Plan notes found for this encounter         Diagnoses and all orders for this visit:    Malignant neoplasm of lower third of esophagus (HCC)    Gastroesophageal reflux disease without esophagitis    Essential hypertension    Mild hyperlipidemia    Other orders  -     Discontinue: acetaminophen (TYLENOL) 160 mg/5 mL solution; 650 mg  -     Discontinue: aluminum-magnesium hydroxide-simethicone (MYLANTA) 200-200-20 mg/5 mL suspension; 15 mL  -     atenolol (TENORMIN) 25 mg tablet;  Take 25 mg by mouth  -     Discontinue: chlorhexidine (PERIDEX) 0 12 % solution; 5 mL  -     Discontinue: fluconazole (DIFLUCAN) 100 mg tablet; 100 mg  -     Discontinue: ibuprofen (MOTRIN) 100 mg/5 mL suspension; 400 mg  -     Discontinue: loperamide (IMODIUM) 1 mg/5 mL oral liquid; 2 mg  -     Discontinue: oxyCODONE (ROXICODONE) 5 mg/5 mL solution; 5 mg  -     Discontinue: polyethylene glycol (MIRALAX) 17 g packet; 17 g  -     ranitidine (ZANTAC) 15 mg/mL syrup; 150 mg  -     Discontinue: Sennosides (SENNA) 8 8 mg/5 mL oral syrup; 17 6 mg

## 2019-03-08 NOTE — TELEPHONE ENCOUNTER
Please clarify need- I have already sent to [facility]  Does another RX need to sent to [facility]?

## 2019-03-11 RX ORDER — RANITIDINE 150 MG/1
150 CAPSULE ORAL 2 TIMES DAILY
Qty: 180 CAPSULE | Refills: 3 | Status: SHIPPED | OUTPATIENT
Start: 2019-03-11 | End: 2019-05-03

## 2019-03-11 NOTE — TELEPHONE ENCOUNTER
Per pt- the RX at [facility] was cancelled- PT needs RX sent to [facility]! !

## 2019-05-03 DIAGNOSIS — K21.9 GASTROESOPHAGEAL REFLUX DISEASE, ESOPHAGITIS PRESENCE NOT SPECIFIED: Primary | ICD-10-CM

## 2019-05-03 RX ORDER — RANITIDINE 150 MG/1
150 TABLET ORAL 2 TIMES DAILY
Qty: 180 TABLET | Refills: 0 | Status: SHIPPED | OUTPATIENT
Start: 2019-05-03 | End: 2020-05-08 | Stop reason: ALTCHOICE

## 2019-05-07 ENCOUNTER — OFFICE VISIT (OUTPATIENT)
Dept: ENDOCRINOLOGY | Facility: HOSPITAL | Age: 74
End: 2019-05-07
Payer: MEDICARE

## 2019-05-07 VITALS
HEIGHT: 60 IN | SYSTOLIC BLOOD PRESSURE: 136 MMHG | DIASTOLIC BLOOD PRESSURE: 80 MMHG | WEIGHT: 141.4 LBS | HEART RATE: 78 BPM | BODY MASS INDEX: 27.76 KG/M2

## 2019-05-07 DIAGNOSIS — E04.2 GOITER, NONTOXIC, MULTINODULAR: Primary | ICD-10-CM

## 2019-05-07 DIAGNOSIS — E04.1 THYROID NODULE: ICD-10-CM

## 2019-05-07 PROCEDURE — 99213 OFFICE O/P EST LOW 20 MIN: CPT | Performed by: INTERNAL MEDICINE

## 2019-06-17 ENCOUNTER — OFFICE VISIT (OUTPATIENT)
Dept: FAMILY MEDICINE CLINIC | Facility: CLINIC | Age: 74
End: 2019-06-17
Payer: MEDICARE

## 2019-06-17 VITALS
OXYGEN SATURATION: 98 % | WEIGHT: 141 LBS | HEART RATE: 60 BPM | BODY MASS INDEX: 27.68 KG/M2 | DIASTOLIC BLOOD PRESSURE: 66 MMHG | HEIGHT: 60 IN | TEMPERATURE: 97.5 F | SYSTOLIC BLOOD PRESSURE: 134 MMHG

## 2019-06-17 DIAGNOSIS — C15.5 MALIGNANT NEOPLASM OF LOWER THIRD OF ESOPHAGUS (HCC): ICD-10-CM

## 2019-06-17 DIAGNOSIS — E04.2 GOITER, NONTOXIC, MULTINODULAR: ICD-10-CM

## 2019-06-17 DIAGNOSIS — E78.5 MILD HYPERLIPIDEMIA: ICD-10-CM

## 2019-06-17 DIAGNOSIS — I10 ESSENTIAL HYPERTENSION: Primary | ICD-10-CM

## 2019-06-17 DIAGNOSIS — J30.1 NON-SEASONAL ALLERGIC RHINITIS DUE TO POLLEN: ICD-10-CM

## 2019-06-17 PROCEDURE — 99214 OFFICE O/P EST MOD 30 MIN: CPT | Performed by: FAMILY MEDICINE

## 2019-06-17 RX ORDER — CETIRIZINE HYDROCHLORIDE 10 MG/1
10 TABLET ORAL DAILY
COMMUNITY
End: 2020-05-08 | Stop reason: ALTCHOICE

## 2019-12-10 ENCOUNTER — TELEPHONE (OUTPATIENT)
Dept: GASTROENTEROLOGY | Facility: CLINIC | Age: 74
End: 2019-12-10

## 2019-12-18 NOTE — TELEPHONE ENCOUNTER
Dr Smith Organ called-currently undergoing chemo  Her oncologist wants pt to hold off on scheduling recall colon until at least February   Pt states she will call when she is cleared by oncologist

## 2019-12-18 NOTE — TELEPHONE ENCOUNTER
Agree w waiting until after chemo  Please enter 6 mo recall in case we don't hear back by then   Thanks

## 2020-05-08 ENCOUNTER — TELEMEDICINE (OUTPATIENT)
Dept: ENDOCRINOLOGY | Facility: HOSPITAL | Age: 75
End: 2020-05-08
Payer: MEDICARE

## 2020-05-08 VITALS — BODY MASS INDEX: 29.29 KG/M2 | WEIGHT: 150 LBS

## 2020-05-08 DIAGNOSIS — E04.2 GOITER, NONTOXIC, MULTINODULAR: Primary | ICD-10-CM

## 2020-05-08 DIAGNOSIS — E04.1 THYROID NODULE: ICD-10-CM

## 2020-05-08 PROCEDURE — 99214 OFFICE O/P EST MOD 30 MIN: CPT | Performed by: INTERNAL MEDICINE

## 2020-05-08 RX ORDER — OMEPRAZOLE 20 MG/1
20 CAPSULE, DELAYED RELEASE ORAL DAILY
COMMUNITY
Start: 2020-05-01

## 2020-05-08 RX ORDER — LORATADINE 10 MG/1
10 TABLET ORAL DAILY
COMMUNITY

## 2020-08-19 LAB
CREAT ?TM UR-SCNC: 26 UMOL/L
EXT PROTEIN URINE: 4
PROT/CREAT UR: 0.15 MG/G{CREAT}

## 2020-09-16 LAB
CREAT ?TM UR-SCNC: 31 UMOL/L
EXT PROTEIN URINE: 5
PROT/CREAT UR: 0.16 MG/G{CREAT}

## 2020-09-30 LAB
CREAT ?TM UR-SCNC: 22 UMOL/L
EXT PROTEIN URINE: 4
PROT/CREAT UR: 0.18 MG/G{CREAT}

## 2020-11-25 LAB
CREAT ?TM UR-SCNC: 27 UMOL/L
EXT PROTEIN URINE: 5
PROT/CREAT UR: 0.18 MG/G{CREAT}

## 2021-01-10 ENCOUNTER — TELEPHONE (OUTPATIENT)
Dept: ENDOCRINOLOGY | Facility: HOSPITAL | Age: 76
End: 2021-01-10

## 2021-01-11 NOTE — TELEPHONE ENCOUNTER
Spoke to patient  She notes that she is feeling pretty good  [facility] has her on Euthyrox 25mcg daily   She said that its hard to determine if her symptoms are from chemo or the thyroid

## 2021-01-12 DIAGNOSIS — E04.1 THYROID NODULE: Primary | ICD-10-CM

## 2021-01-13 RX ORDER — LEVOTHYROXINE SODIUM 50 UG/1
50 TABLET ORAL DAILY
Qty: 30 TABLET | Refills: 3 | Status: SHIPPED | OUTPATIENT
Start: 2021-01-13 | End: 2021-02-22 | Stop reason: DRUGHIGH

## 2021-01-20 DIAGNOSIS — Z23 ENCOUNTER FOR IMMUNIZATION: ICD-10-CM

## 2021-02-17 LAB
CREAT ?TM UR-SCNC: 11 UMOL/L
EXT PROTEIN URINE: 5
PROT/CREAT UR: 0.45 MG/G{CREAT}

## 2021-02-22 DIAGNOSIS — E04.1 THYROID NODULE: Primary | ICD-10-CM

## 2021-02-22 RX ORDER — LEVOTHYROXINE SODIUM 75 UG/1
75 TABLET ORAL DAILY
Qty: 30 TABLET | Refills: 3 | Status: SHIPPED | OUTPATIENT
Start: 2021-02-22 | End: 2021-06-21 | Stop reason: SDUPTHER

## 2021-04-28 LAB
CREAT ?TM UR-SCNC: 13 UMOL/L
EXT PROTEIN URINE: 4
PROT/CREAT UR: 0.3 MG/G{CREAT}

## 2021-05-11 ENCOUNTER — OFFICE VISIT (OUTPATIENT)
Dept: ENDOCRINOLOGY | Facility: HOSPITAL | Age: 76
End: 2021-05-11
Payer: MEDICARE

## 2021-05-11 VITALS
HEART RATE: 76 BPM | DIASTOLIC BLOOD PRESSURE: 70 MMHG | SYSTOLIC BLOOD PRESSURE: 148 MMHG | HEIGHT: 60 IN | BODY MASS INDEX: 29.33 KG/M2 | WEIGHT: 149.4 LBS

## 2021-05-11 DIAGNOSIS — E04.2 GOITER, NONTOXIC, MULTINODULAR: ICD-10-CM

## 2021-05-11 DIAGNOSIS — E03.9 ACQUIRED HYPOTHYROIDISM: Primary | ICD-10-CM

## 2021-05-11 DIAGNOSIS — E04.1 LEFT THYROID NODULE: ICD-10-CM

## 2021-05-11 PROCEDURE — 99213 OFFICE O/P EST LOW 20 MIN: CPT | Performed by: INTERNAL MEDICINE

## 2021-05-11 RX ORDER — GABAPENTIN 300 MG/1
300 CAPSULE ORAL
COMMUNITY
End: 2022-05-16 | Stop reason: ALTCHOICE

## 2021-05-11 RX ORDER — CARVEDILOL 12.5 MG/1
12.5 TABLET ORAL 2 TIMES DAILY WITH MEALS
COMMUNITY
Start: 2021-03-15

## 2021-05-11 RX ORDER — AMLODIPINE BESYLATE 2.5 MG/1
2.5 TABLET ORAL 2 TIMES DAILY
COMMUNITY
Start: 2021-03-15

## 2021-05-11 NOTE — PROGRESS NOTES
5/11/2021    Assessment/Plan      Diagnoses and all orders for this visit:    Acquired hypothyroidism  -     TSH, 3rd generation Lab Collect; Future  -     T4, free Lab Collect; Future    Goiter, nontoxic, multinodular  -     TSH, 3rd generation Lab Collect; Future  -     T4, free Lab Collect; Future    Left thyroid nodule  -     TSH, 3rd generation Lab Collect; Future  -     T4, free Lab Collect; Future    Other orders  -     amLODIPine (NORVASC) 2 5 mg tablet; Take 2 5 mg by mouth daily   -     carvedilol (COREG) 12 5 mg tablet; Take 12 5 mg by mouth 2 (two) times a day with meals   -     gabapentin (NEURONTIN) 300 mg capsule; Take 300 mg by mouth daily at bedtime        Assessment/Plan:    1  Hypothyroidism  She has recently developed hypothyroidism presumed due to cyramza usage  She is currently on euthrox 75 mcg daily  Thyroid function tests are finally normal   These will continue to be followed over time and dosage adjusted as needed  2  Nontoxic multinodular goiter  She has no compressive thyroid symptoms  3  Left-sided thyroid nodule  She has a dominant left-sided nodule that was biopsy benign in the past   It is not changed in size and this will be continued to be followed over time  I have asked her to follow up in 6 months with preceding TSH and free T4  After that visit, a thyroid ultrasound will likely be ordered  CC:   Hypothyroid, thyroid nodule and goiter follow-up    History of Present Illness     HPI: Glorietta Phalen is a 76y o  year old female with history of Nontoxic multinodular goiter with left-sided dominant thyroid nodule for follow-up visit  She was noted to have thyroid nodules in 2016 on routine physical exam   She had a left dominant thyroid nodule that underwent biopsy in September 2016 and the pathology was benign  She has been followed over time with serial ultrasounds and blood work        She was recently found to have hypothyroidism on routine blood work by her oncologist in started on Euthrox  75 mcg daily in fall 2020  This was increased quickly earlier this year  Now on cyramza alone  Taxol stopped at 14 doses due to side efects  Weight is 8 lb more than 2019  She has had fatigue for several days after her chemotherapy but not typically  She denies insomnia, anxiety or depression, heat or cold intolerance, palpitation, or tremors  She does have intermittent diarrhea  She denies  constipation  She has dry skin on the lower part of her legs and some brittle nails  Her hair is growing back since she lost all with her chemotherapy  She denies diplopia  She denies compressive thyroid symptoms or difficulties with swallowing  Review of Systems   Constitutional: Positive for fatigue  Negative for unexpected weight change  Weight up 8 lbs since 2019  Fatigue for several days after chemo  HENT: Negative for trouble swallowing  Eyes: Positive for visual disturbance  Wears glasses  Had blurry vision with taxol, this is improving off taxol  No Diplopia  Respiratory: Negative for chest tightness and shortness of breath  Cardiovascular: Negative for chest pain and palpitations  Gastrointestinal: Positive for diarrhea  Negative for abdominal pain, constipation and nausea  Intermittent diarrhea  Endocrine: Negative for cold intolerance and heat intolerance  Skin: Positive for rash  Has dry skin on the lower part of the legs  Some lower leg rash  Using hydrocortisone 1% cream for itching  Has brittle nails, left worse than right  Hair is growing back, had lost all with chemo  Neurological: Positive for numbness  Negative for dizziness, tremors, light-headedness and headaches  Has neuropathy of the hands, left worse than the right  Fingertips get very cold  Psychiatric/Behavioral: Negative for dysphoric mood and sleep disturbance  The patient is not nervous/anxious           Gabapentin helps feet neuropathy symptoms so she can sleep  Historical Information   Past Medical History:   Diagnosis Date    Cataract     OS    Esophageal cancer ([address] )     GERD (gastroesophageal reflux disease)     Hyperlipidemia     Hypertension     Seasonal allergies      Past Surgical History:   Procedure Laterality Date    APPENDECTOMY      ASPIRATION BIOPSY      thyroid    CATARACT EXTRACTION W/  INTRAOCULAR LENS IMPLANT Right     CHOLECYSTECTOMY      ESOPHAGECTOMY  01/2019    HYSTERECTOMY      total    OK XCAPSL CTRC RMVL INSJ IO LENS PROSTH W/O ECP Left 10/19/2016    Procedure: OS EXTRACTION EXTRACAPSULAR CATARACT PHACO INTRAOCULAR LENS (IOL);   Surgeon: Anali Milton MD;  Location:  MAIN OR;  Service: Ophthalmology    TOTAL KNEE ARTHROPLASTY Left     VAGINAL DELIVERY      X 4     Social History   Social History     Substance and Sexual Activity   Alcohol Use Yes    Frequency: Monthly or less    Comment: twice monthly     Social History     Substance and Sexual Activity   Drug Use No     Social History     Tobacco Use   Smoking Status Never Smoker   Smokeless Tobacco Never Used     Family History:   Family History   Problem Relation Age of Onset    Coronary artery disease Mother     Other Mother         arterioloscleroses    Tuberculosis Father     Cancer Sister     Early death Sister     Colon cancer Sister     Cancer Brother     Early death Brother     Colon cancer Brother     Drug abuse Grandchild     Alcohol abuse Son     Drug abuse Cousin     No Known Problems Son     No Known Problems Daughter     No Known Problems Daughter        Meds/Allergies   Current Outpatient Medications   Medication Sig Dispense Refill    amLODIPine (NORVASC) 2 5 mg tablet Take 2 5 mg by mouth daily       aspirin 81 MG tablet Take 1 tablet by mouth daily      carvedilol (COREG) 12 5 mg tablet Take 12 5 mg by mouth 2 (two) times a day with meals       Euthyrox 75 MCG tablet Take 1 tablet (75 mcg total) by mouth daily 30 tablet 3    gabapentin (NEURONTIN) 300 mg capsule Take 300 mg by mouth daily at bedtime      lisinopril (ZESTRIL) 20 mg tablet Take 20 mg by mouth 2 (two) times a day       loratadine (CLARITIN) 10 mg tablet Take 10 mg by mouth daily      omeprazole (PriLOSEC) 20 mg delayed release capsule Take 20 mg by mouth daily       atenolol (TENORMIN) 25 mg tablet Take 25 mg by mouth       No current facility-administered medications for this visit  No Known Allergies    Objective   Vitals: Blood pressure 148/70, pulse 76, height 5' (1 524 m), weight 67 8 kg (149 lb 6 4 oz)  Invasive Devices     None                 Physical Exam  Vitals signs reviewed  Constitutional:       Appearance: Normal appearance  She is well-developed  HENT:      Head: Normocephalic and atraumatic  Eyes:      Extraocular Movements: Extraocular movements intact  Conjunctiva/sclera: Conjunctivae normal       Comments: No lid lag, stare, proptosis, or periorbital edema  Neck:      Musculoskeletal: Normal range of motion and neck supple  Thyroid: No thyromegaly  Vascular: No carotid bruit  Comments: Thyroid normal in size without palpable nodules  No bruits over the thyroid gland  Cardiovascular:      Rate and Rhythm: Normal rate and regular rhythm  Heart sounds: Normal heart sounds  No murmur  Pulmonary:      Effort: Pulmonary effort is normal       Breath sounds: Normal breath sounds  No wheezing  Abdominal:      Palpations: Abdomen is soft  Musculoskeletal: Normal range of motion  General: No deformity  Right lower leg: No edema  Left lower leg: No edema  Comments: No tremor of the outstretched hands  Lymphadenopathy:      Cervical: No cervical adenopathy  Skin:     General: Skin is warm and dry  Findings: No rash  Neurological:      Mental Status: She is alert and oriented to person, place, and time  Deep Tendon Reflexes: Reflexes are normal and symmetric  Comments: Deep tendon reflexes normal          The history was obtained from the review of the chart and from the patient  Lab Results:      Blood work done on 04/28/2021 at [facility] showed a TSH of 3 05 with a free T4 of 1 37        Future Appointments   Date Time Provider Caitlin Ross   11/16/2021  9:20 AM Leslie Philip MD ENDO  [facility]

## 2021-05-11 NOTE — PATIENT INSTRUCTIONS
The most recent thyroid blood work is normal   Continue the same euthrox 75 mcg daily  Follow up in 6 months with blood work

## 2021-06-21 DIAGNOSIS — E04.1 THYROID NODULE: ICD-10-CM

## 2021-06-21 RX ORDER — LEVOTHYROXINE SODIUM 75 UG/1
75 TABLET ORAL DAILY
Qty: 90 TABLET | Refills: 1 | Status: SHIPPED | OUTPATIENT
Start: 2021-06-21 | End: 2021-11-16 | Stop reason: SDUPTHER

## 2021-11-16 ENCOUNTER — OFFICE VISIT (OUTPATIENT)
Dept: ENDOCRINOLOGY | Facility: HOSPITAL | Age: 76
End: 2021-11-16
Payer: MEDICARE

## 2021-11-16 VITALS
BODY MASS INDEX: 27.13 KG/M2 | HEIGHT: 60 IN | DIASTOLIC BLOOD PRESSURE: 64 MMHG | SYSTOLIC BLOOD PRESSURE: 128 MMHG | HEART RATE: 54 BPM | WEIGHT: 138.2 LBS

## 2021-11-16 DIAGNOSIS — E04.2 GOITER, NONTOXIC, MULTINODULAR: ICD-10-CM

## 2021-11-16 DIAGNOSIS — E04.1 THYROID NODULE: ICD-10-CM

## 2021-11-16 DIAGNOSIS — E04.1 LEFT THYROID NODULE: ICD-10-CM

## 2021-11-16 DIAGNOSIS — E03.9 ACQUIRED HYPOTHYROIDISM: Primary | ICD-10-CM

## 2021-11-16 PROCEDURE — 99214 OFFICE O/P EST MOD 30 MIN: CPT | Performed by: INTERNAL MEDICINE

## 2021-11-16 RX ORDER — LEVOTHYROXINE SODIUM 75 UG/1
75 TABLET ORAL DAILY
Qty: 90 TABLET | Refills: 3 | Status: SHIPPED | OUTPATIENT
Start: 2021-11-16

## 2021-11-16 RX ORDER — FUROSEMIDE 40 MG/1
40 TABLET ORAL DAILY
COMMUNITY

## 2022-05-16 ENCOUNTER — OFFICE VISIT (OUTPATIENT)
Dept: ENDOCRINOLOGY | Facility: HOSPITAL | Age: 77
End: 2022-05-16
Payer: MEDICARE

## 2022-05-16 VITALS
WEIGHT: 142.4 LBS | BODY MASS INDEX: 27.96 KG/M2 | DIASTOLIC BLOOD PRESSURE: 64 MMHG | HEIGHT: 60 IN | SYSTOLIC BLOOD PRESSURE: 122 MMHG | HEART RATE: 60 BPM

## 2022-05-16 DIAGNOSIS — E03.9 ACQUIRED HYPOTHYROIDISM: ICD-10-CM

## 2022-05-16 DIAGNOSIS — E04.1 LEFT THYROID NODULE: ICD-10-CM

## 2022-05-16 DIAGNOSIS — E04.2 GOITER, NONTOXIC, MULTINODULAR: Primary | ICD-10-CM

## 2022-05-16 PROCEDURE — 99214 OFFICE O/P EST MOD 30 MIN: CPT | Performed by: INTERNAL MEDICINE

## 2022-05-16 RX ORDER — ATORVASTATIN CALCIUM 40 MG/1
40 TABLET, FILM COATED ORAL DAILY
COMMUNITY
Start: 2022-03-11

## 2022-05-16 NOTE — PROGRESS NOTES
5/16/2022    Assessment/Plan      Diagnoses and all orders for this visit:    Goiter, nontoxic, multinodular  -     TSH, 3rd generation Lab Collect; Future  -     T4, free Lab Collect; Future    Left thyroid nodule  -     TSH, 3rd generation Lab Collect; Future  -     T4, free Lab Collect; Future    Acquired hypothyroidism  -     TSH, 3rd generation Lab Collect; Future  -     T4, free Lab Collect; Future    Other orders  -     atorvastatin (LIPITOR) 40 mg tablet; Take 40 mg by mouth in the morning  Assessment/Plan:  1  Hypothyroidism  Most recent thyroid function tests are normal   She is biochemically euthyroid  She will continue the same levothyroxine 75 mcg daily  2  Nontoxic multinodular goiter  She has no compressive thyroid symptoms  Thyroid ultrasound shows stable small subcentimeter thyroid nodules some of which have decreased in size  3  Left-sided thyroid nodule  She has a dominant left-sided thyroid nodule that is now biopsy benign  I have asked her to follow up in 6 months with preceding TSH and free T4  If her blood work is stable, I will have her follow up in 1 year thereafter  CC:  Hypothyroid, thyroid nodule and goiter follow-up    History of Present Illness     HPI: Josee Lisa is a 68y o  year old female with history of toxic multinodular goiter with left-sided dominant thyroid nodule and hypothyroidism for follow-up visit  She was noted to have thyroid nodules in 2016 on routine physical exam   A left dominant thyroid nodule underwent biopsy in September 2016 with benign pathology  She has been followed over time with serial ultrasounds  Last thyroid ultrasound was spring 2020  She was found to have hypothyroidism in 2021 on routine blood work by her oncologist and started levothyroxine in the fall 2020  This had to be increased quite quickly during 2021 as she was on Cyramza which likely contributed to the worsening thyroid function tests   She stopped Rian Daniel in jan 2022 and is in remission  She is currently taking levothyroxine 75 mcg daily  Weight is 4 lb more than last visit  She does have some fatigue at times  She is always somewhat cold and has cold intolerance  She has some dry skin and brittle nails but no hair loss  She denies insomnia, diarrhea constipation, anxiety or depression, heat intolerance, palpitation, or tremors  She denies diplopia  She denies compressive thyroid symptoms or difficulties with swallowing  Had severe headches in the past, thought due to too much potassium so stopped it and headaches gone  Review of Systems   Constitutional: Positive for fatigue  Negative for unexpected weight change  Weight 4 lb more than last visit  Fatigue at times  HENT: Negative for trouble swallowing  Eyes: Negative for visual disturbance  Wears glasses  No diplopia  Respiratory: Negative for chest tightness and shortness of breath  Cardiovascular: Negative for chest pain and palpitations  Gastrointestinal: Negative for abdominal pain, constipation, diarrhea and nausea  Endocrine: Positive for cold intolerance  Negative for heat intolerance  Always cold  Skin: Negative for rash  Some dry skin  Has brittle nails  No hair loss  Neurological: Positive for numbness  Negative for dizziness, tremors, light-headedness and headaches  Some numbness and tingling of the toes and feet, off gabapentin and potassium  Since last jan 2022  Psychiatric/Behavioral: Negative for dysphoric mood and sleep disturbance  The patient is not nervous/anxious          Historical Information   Past Medical History:   Diagnosis Date    Cataract     OS    Esophageal cancer ([address] )     GERD (gastroesophageal reflux disease)     Hyperlipidemia     Hypertension     Seasonal allergies      Past Surgical History:   Procedure Laterality Date    APPENDECTOMY      ASPIRATION BIOPSY      thyroid    CATARACT EXTRACTION W/  INTRAOCULAR LENS IMPLANT Right     CHOLECYSTECTOMY      ESOPHAGECTOMY  01/2019    HYSTERECTOMY      total    ID XCAPSL CTRC RMVL INSJ IO LENS PROSTH W/O ECP Left 10/19/2016    Procedure: OS EXTRACTION EXTRACAPSULAR CATARACT PHACO INTRAOCULAR LENS (IOL); Surgeon: Sha Colmenares MD;  Location:  MAIN OR;  Service: Ophthalmology    TOTAL KNEE ARTHROPLASTY Left     VAGINAL DELIVERY      X 4     Social History   Social History     Substance and Sexual Activity   Alcohol Use Yes    Comment: twice monthly     Social History     Substance and Sexual Activity   Drug Use No     Social History     Tobacco Use   Smoking Status Never Smoker   Smokeless Tobacco Never Used     Family History:   Family History   Problem Relation Age of Onset    Coronary artery disease Mother     Other Mother         arterioloscleroses    Tuberculosis Father     Cancer Sister     Early death Sister     Colon cancer Sister     Cancer Brother     Early death Brother     Colon cancer Brother     Drug abuse Grandchild     Alcohol abuse Son     Drug abuse Cousin     No Known Problems Son     No Known Problems Daughter     No Known Problems Daughter        Meds/Allergies   Current Outpatient Medications   Medication Sig Dispense Refill    amLODIPine (NORVASC) 2 5 mg tablet Take 2 5 mg by mouth 2 (two) times a day        aspirin 81 MG tablet Take 1 tablet by mouth daily      atorvastatin (LIPITOR) 40 mg tablet Take 40 mg by mouth in the morning   carvedilol (COREG) 12 5 mg tablet Take 12 5 mg by mouth 2 (two) times a day with meals       Euthyrox 75 MCG tablet Take 1 tablet (75 mcg total) by mouth daily 90 tablet 3    furosemide (LASIX) 40 mg tablet Take 40 mg by mouth in the morning        lisinopril (ZESTRIL) 20 mg tablet Take 20 mg by mouth 2 (two) times a day       loratadine (CLARITIN) 10 mg tablet Take 10 mg by mouth daily      omeprazole (PriLOSEC) 20 mg delayed release capsule Take 20 mg by mouth daily        No current facility-administered medications for this visit  No Known Allergies    Objective   Vitals: Blood pressure 122/64, pulse 60, height 5' (1 524 m), weight 64 6 kg (142 lb 6 4 oz)  Invasive Devices  Report    None                 Physical Exam  Vitals reviewed  Constitutional:       Appearance: Normal appearance  She is well-developed  HENT:      Head: Normocephalic and atraumatic  Eyes:      Extraocular Movements: Extraocular movements intact  Conjunctiva/sclera: Conjunctivae normal       Comments: No lid lag, stare, proptosis, or periorbital edema  Neck:      Thyroid: No thyromegaly  Vascular: No carotid bruit  Comments: Thyroid normal in size without palpable nodules  Thyroid irregular in feel  No bruits over the thyroid gland  Cardiovascular:      Rate and Rhythm: Normal rate and regular rhythm  Heart sounds: Normal heart sounds  No murmur heard  Pulmonary:      Effort: Pulmonary effort is normal       Breath sounds: Normal breath sounds  No wheezing  Abdominal:      Palpations: Abdomen is soft  Musculoskeletal:         General: No deformity  Cervical back: Normal range of motion and neck supple  Right lower leg: Edema present  Left lower leg: Edema present  Comments: 1-2+ bilateral lower extremity edema  No tremor of the outstretched hands  Lymphadenopathy:      Cervical: No cervical adenopathy  Skin:     General: Skin is warm and dry  Findings: No rash  Neurological:      Mental Status: She is alert and oriented to person, place, and time  Deep Tendon Reflexes: Reflexes are normal and symmetric  Comments: Patellar deep tendon reflexes normal          The history was obtained from the review of the chart and from the patient  Lab Results:      Blood work done on 05/04/2022 showed a TSH of 1 21 with a free T4 of 1 44      CMP showed a glucose of 91 fasting, BUN/creatinine ratio 25 8, but was otherwise normal     She does not have low iron studies      Lab Results   Component Value Date    CHOL 180 08/14/2015    HDL 69 08/14/2015    TRIG 137 08/14/2015         Future Appointments   Date Time Provider Caitlin Ross   11/17/2022 10:20 AM Cici Davis MD [facility]

## 2022-05-16 NOTE — PATIENT INSTRUCTIONS
The thyroid blood work is normal    Continue the same levothyroxine 75 mcg daily  The thyroid ultrasound shows stable small nodules  Follow up in 6 months with blood work

## 2022-11-08 DIAGNOSIS — E04.1 THYROID NODULE: ICD-10-CM

## 2022-11-08 RX ORDER — LEVOTHYROXINE SODIUM 75 UG/1
TABLET ORAL
Qty: 90 TABLET | Refills: 3 | Status: SHIPPED | OUTPATIENT
Start: 2022-11-08

## 2022-11-17 ENCOUNTER — OFFICE VISIT (OUTPATIENT)
Dept: ENDOCRINOLOGY | Facility: HOSPITAL | Age: 77
End: 2022-11-17

## 2022-11-17 VITALS
WEIGHT: 141.2 LBS | DIASTOLIC BLOOD PRESSURE: 80 MMHG | HEART RATE: 59 BPM | BODY MASS INDEX: 27.72 KG/M2 | HEIGHT: 60 IN | SYSTOLIC BLOOD PRESSURE: 120 MMHG

## 2022-11-17 DIAGNOSIS — E04.2 GOITER, NONTOXIC, MULTINODULAR: ICD-10-CM

## 2022-11-17 DIAGNOSIS — E03.9 ACQUIRED HYPOTHYROIDISM: Primary | ICD-10-CM

## 2022-11-17 DIAGNOSIS — E04.1 LEFT THYROID NODULE: ICD-10-CM

## 2022-11-17 RX ORDER — MELATONIN
1000 DAILY
COMMUNITY

## 2022-11-17 NOTE — PATIENT INSTRUCTIONS
The blood work is all normal     Continue the same levothyroxine 75 mcg daily  Follow up in 1 year with blood work  We'll recheck ultrasound in 2 years

## 2022-11-17 NOTE — PROGRESS NOTES
11/19/2022    Assessment/Plan      Diagnoses and all orders for this visit:    Acquired hypothyroidism  -     TSH, 3rd generation Lab Collect; Future  -     T4, free Lab Collect; Future    Goiter, nontoxic, multinodular  -     TSH, 3rd generation Lab Collect; Future  -     T4, free Lab Collect; Future    Left thyroid nodule  -     TSH, 3rd generation Lab Collect; Future  -     T4, free Lab Collect; Future    Other orders  -     cholecalciferol (VITAMIN D3) 1,000 units tablet; Take 1,000 Units by mouth daily  -     BIOTIN PO; Take by mouth        Assessment/Plan:  1  Nontoxic multinodular goiter  She has no compressive thyroid symptoms  2  Left-sided thyroid nodule  This nodule was biopsy benign in the past   Last thyroid ultrasound was spring 2022 which is stable  She will be due for repeat ultrasound in spring 2024  3  Hypothyroidism  Most recent thyroid function studies are normal   She is clinically and biochemically euthyroid  She will continue the same levothyroxine 75 mcg daily  I have asked her to follow up in 1 year with preceding TSH and free T4       CC:  Hypothyroid, thyroid nodule and goiter follow-up    History of Present Illness     HPI: Sandra Girard is a 68y o  year old female with history of non toxic multinodular goiter with left-sided dominant thyroid nodule and hypothyroidism for follow-up visit  She was noted to have thyroid nodules in 2016 on routine physical exam   A left dominant thyroid nodule underwent biopsy in September 2016 with benign pathology she is been followed over time with serial ultrasounds  Last ultrasound was in the spring 2022  She has no compressive thyroid symptoms or difficulties with swallowing  She was found to have hypothyroidism in 2021 on routine blood work by her oncologist and started levothyroxine in the fall 2020    This was increased quite quickly during 2021 as she was on Cyramza which likely contributed to the worsening thyroid function tests  She stopped this medicine in January 2022 and is on remission but is still currently on levothyroxine 75 mcg daily  She is always somewhat cold  She has chronic diarrhea unchanged  She has dry skin and some brittle nails along with some hair loss  She is not fatigued but will fall asleep if idle  She denies heat intolerance, palpitation, tremors, constipation, insomnia, weight changes, anxiety or depression  She denies diplopia  Review of Systems   Constitutional: Negative for fatigue and unexpected weight change  Will sleep if idle  HENT: Negative for trouble swallowing  Eyes: Negative for visual disturbance  Wears glasses  Respiratory: Negative for chest tightness and shortness of breath  Cardiovascular: Negative for chest pain and palpitations  Gastrointestinal: Positive for diarrhea  Negative for abdominal pain, constipation and nausea  Has chronic diarrhea with no change  Endocrine: Positive for cold intolerance  Negative for heat intolerance  Musculoskeletal:        Has a new cyst on top on foot  Skin: Negative for rash  Has dry skin  Some brittle nails, better on biotin  Some hair loss  Neurological: Negative for dizziness, tremors, light-headedness and headaches  Hematological:        Has PET scan and sees oncologist next month  Cancer all stable  Psychiatric/Behavioral: Negative for dysphoric mood and sleep disturbance  The patient is not nervous/anxious          Historical Information   Past Medical History:   Diagnosis Date   • Cataract     OS   • Esophageal cancer ([facility] [address] )    • GERD (gastroesophageal reflux disease)    • Hyperlipidemia    • Hypertension    • Seasonal allergies      Past Surgical History:   Procedure Laterality Date   • APPENDECTOMY     • ASPIRATION BIOPSY      thyroid   • CATARACT EXTRACTION W/  INTRAOCULAR LENS IMPLANT Right    • CHOLECYSTECTOMY     • ESOPHAGECTOMY  01/2019   • HYSTERECTOMY      total   • AR XCAPSL CTRC RMVL INSJ IO LENS PROSTH W/O ECP Left 10/19/2016    Procedure: OS EXTRACTION EXTRACAPSULAR CATARACT PHACO INTRAOCULAR LENS (IOL); Surgeon: Pierre Brooks MD;  Location:  MAIN OR;  Service: Ophthalmology   • TOTAL KNEE ARTHROPLASTY Left    • VAGINAL DELIVERY      X 4     Social History   Social History     Substance and Sexual Activity   Alcohol Use Yes    Comment: twice monthly     Social History     Substance and Sexual Activity   Drug Use No     Social History     Tobacco Use   Smoking Status Never   Smokeless Tobacco Never     Family History:   Family History   Problem Relation Age of Onset   • Coronary artery disease Mother    • Other Mother         arterioloscleroses   • Tuberculosis Father    • Cancer Sister    • Early death Sister    • Colon cancer Sister    • Cancer Brother    • Early death Brother    • Colon cancer Brother    • Drug abuse Grandchild    • Alcohol abuse Son    • Drug abuse Cousin    • No Known Problems Son    • No Known Problems Daughter    • No Known Problems Daughter        Meds/Allergies   Current Outpatient Medications   Medication Sig Dispense Refill   • amLODIPine (NORVASC) 2 5 mg tablet Take 2 5 mg by mouth 2 (two) times a day       • aspirin 81 MG tablet Take 1 tablet by mouth daily     • atorvastatin (LIPITOR) 40 mg tablet Take 40 mg by mouth in the morning  • BIOTIN PO Take by mouth     • carvedilol (COREG) 12 5 mg tablet Take 12 5 mg by mouth 2 (two) times a day with meals      • cholecalciferol (VITAMIN D3) 1,000 units tablet Take 1,000 Units by mouth daily     • Euthyrox 75 MCG tablet TAKE 1 TABLET BY MOUTH DAILY 90 tablet 3   • furosemide (LASIX) 40 mg tablet Take 40 mg by mouth in the morning       • lisinopril (ZESTRIL) 20 mg tablet Take 20 mg by mouth 2 (two) times a day      • loratadine (CLARITIN) 10 mg tablet Take 10 mg by mouth daily     • omeprazole (PriLOSEC) 20 mg delayed release capsule Take 20 mg by mouth daily        No current facility-administered medications for this visit  No Known Allergies    Objective   Vitals: Blood pressure 120/80, pulse 59, height 5' (1 524 m), weight 64 kg (141 lb 3 2 oz)  Invasive Devices     None                 Physical Exam  Vitals reviewed  Constitutional:       Appearance: Normal appearance  She is well-developed and well-nourished  HENT:      Head: Normocephalic and atraumatic  Eyes:      Extraocular Movements: Extraocular movements intact and EOM normal       Conjunctiva/sclera: Conjunctivae normal       Comments: No lid lag, stare, proptosis, or periorbital edema  Neck:      Thyroid: No thyromegaly  Vascular: No carotid bruit  Comments: Thyroid irregular in feel  No bruits over the thyroid gland  Cardiovascular:      Rate and Rhythm: Normal rate and regular rhythm  Pulses: Intact distal pulses  Heart sounds: Normal heart sounds  No murmur heard  Pulmonary:      Effort: Pulmonary effort is normal       Breath sounds: Normal breath sounds  No wheezing  Abdominal:      Palpations: Abdomen is soft  Musculoskeletal:         General: No deformity  Normal range of motion  Cervical back: Normal range of motion and neck supple  Right lower leg: Edema present  Left lower leg: Edema present  Comments: 2+ bilateral lower extremity edema  No tremor of the outstretched hands  Lymphadenopathy:      Cervical: No cervical adenopathy  Skin:     General: Skin is warm and dry  Findings: No rash  Neurological:      Mental Status: She is alert and oriented to person, place, and time  Deep Tendon Reflexes: Reflexes are normal and symmetric  Comments: Patellar deep tendon reflexes normal          The history was obtained from the review of the chart and from the patient  Lab Results:      Blood work done on 11/07/2022 at [address] [place] [address] showed a TSH of 1 09 with a free T4 of 1 47      Future Appointments   Date Time Provider Caitlin Ross 11/17/2023 11:20 AM Jozef Kent MD [facility]

## 2023-11-17 ENCOUNTER — OFFICE VISIT (OUTPATIENT)
Dept: ENDOCRINOLOGY | Facility: HOSPITAL | Age: 78
End: 2023-11-17
Payer: MEDICARE

## 2023-11-17 VITALS
SYSTOLIC BLOOD PRESSURE: 110 MMHG | DIASTOLIC BLOOD PRESSURE: 62 MMHG | BODY MASS INDEX: 28.86 KG/M2 | WEIGHT: 147 LBS | HEIGHT: 60 IN | HEART RATE: 55 BPM

## 2023-11-17 DIAGNOSIS — E04.1 LEFT THYROID NODULE: ICD-10-CM

## 2023-11-17 DIAGNOSIS — E04.2 GOITER, NONTOXIC, MULTINODULAR: ICD-10-CM

## 2023-11-17 DIAGNOSIS — E03.9 ACQUIRED HYPOTHYROIDISM: Primary | ICD-10-CM

## 2023-11-17 PROCEDURE — 99214 OFFICE O/P EST MOD 30 MIN: CPT | Performed by: INTERNAL MEDICINE

## 2023-11-17 NOTE — PATIENT INSTRUCTIONS
The thyroid blood work is normal.     Continue the same euthrox/levothyroxine 75 mcg daily.      Follow up in 1 year with blood work and thyroid ultrasound

## 2023-11-17 NOTE — ASSESSMENT & PLAN NOTE
Most recent thyroid function studies are normal signifying biochemical euthyroidism. She is clinically euthyroid. We will continue the same levothyroxine 75 mcg daily.

## 2023-11-17 NOTE — PROGRESS NOTES
11/17/2023    Assessment/Plan     1. Acquired hypothyroidism  Assessment & Plan:  Most recent thyroid function studies are normal signifying biochemical euthyroidism. She is clinically euthyroid. We will continue the same levothyroxine 75 mcg daily. Orders:  -     T4, free Lab Collect; Future; Expected date: 10/07/2024  -     TSH, 3rd generation Lab Collect; Future; Expected date: 10/07/2024  -     US thyroid; Future; Expected date: 10/07/2024    2. Goiter, nontoxic, multinodular  Assessment & Plan:  The left nodule has been biopsied benign in the past. Last thyroid ultrasound was on 05/2022. She has no compressive thyroid symptoms. I will repeat a thyroid ultrasound before her visit next year. Orders:  -     T4, free Lab Collect; Future; Expected date: 10/07/2024  -     TSH, 3rd generation Lab Collect; Future; Expected date: 10/07/2024  -     US thyroid; Future; Expected date: 10/07/2024    3. Left thyroid nodule  -     T4, free Lab Collect; Future; Expected date: 10/07/2024  -     TSH, 3rd generation Lab Collect; Future; Expected date: 10/07/2024  -     US thyroid; Future; Expected date: 10/07/2024         I have asked her to follow up in 1 year with preceding TSH, free T4, and thyroid ultrasound. CC: Nontoxic multinodular goiter with left-sided dominant thyroid nodule and hypothyroidism    HPI: Kyara Xie is a 70-year-old female who presents into the clinic with history of nontoxic multinodular goiter with left-sided dominant thyroid nodule and hypothyroidism for a follow-up visit. She was noted to have thyroid nodules in 2016 on a routine physical exam. A left dominant thyroid nodule was noted, and she underwent biopsy in 09/2016 demonstrating benign pathology. She has been followed with serial ultrasounds and last ultrasound was spring 2022. She was also found to have hypothyroidism in 2021 on routine blood work by her oncologist and started levothyroxine in fall 2020.  Levothyroxine dosage was increased quite quickly during 2021 as she was on Cyramza, which likely contributed to worsening thyroid function studies. This medication was stopped in 01/2022 as she is in remission, and she is currently still on replacement dose levothyroxine 75 mcg daily. The patient is doing well with her current thyroid medication. She has cold intolerance. She has mild occasional heart racing, palpitations, dry skin, but has excessive nail breakage but biotin is proving to be helpful. She has not noticed any chest pain, dyspnea, tremors, dysphagia, diplopia, or blurry vision but wears eyeglasses. She admits having chronic diarrhea, which she believes is from her esophagectomy surgery. She experiences nausea and abdominal pain on certain days. She sleeps well at night and does not feel fatigued. She has gained 6 pounds since the last visit due to her intake of junk food. She denies any significant anxiety or depression. She mentions that her cancer is currently in a state of remission, which her recent PET scan shows as clear. She adds that it has been 2 years since she had the last treatment. Review of Systems  The pertinent positive and negative findings are as noted in the HPI. Historical Information   Past Medical History:   Diagnosis Date    Cataract     OS    Esophageal cancer (HCC)     GERD (gastroesophageal reflux disease)     Hyperlipidemia     Hypertension     Seasonal allergies      Past Surgical History:   Procedure Laterality Date    APPENDECTOMY      ASPIRATION BIOPSY      thyroid    CATARACT EXTRACTION W/  INTRAOCULAR LENS IMPLANT Right     CHOLECYSTECTOMY      ESOPHAGECTOMY  01/2019    HYSTERECTOMY      total    NE XCAPSL CTRC RMVL INSJ IO LENS PROSTH W/O ECP Left 10/19/2016    Procedure: OS EXTRACTION EXTRACAPSULAR CATARACT PHACO INTRAOCULAR LENS (IOL);   Surgeon: Bridgette Landry MD;  Location:  MAIN OR;  Service: Ophthalmology    TOTAL KNEE ARTHROPLASTY Left     VAGINAL DELIVERY      X 4 Social History   Social History     Substance and Sexual Activity   Alcohol Use Yes    Comment: twice monthly     Social History     Substance and Sexual Activity   Drug Use No     Social History     Tobacco Use   Smoking Status Never   Smokeless Tobacco Never     Family History:   Family History   Problem Relation Age of Onset    Coronary artery disease Mother     Other Mother         arterioloscleroses    Tuberculosis Father     Cancer Sister     Early death Sister     Colon cancer Sister     Cancer Brother     Early death Brother     Colon cancer Brother     Drug abuse Grandchild     Alcohol abuse Son     Drug abuse Cousin     No Known Problems Son     No Known Problems Daughter     No Known Problems Daughter        Meds/Allergies   Current Outpatient Medications   Medication Sig Dispense Refill    amLODIPine (NORVASC) 2.5 mg tablet Take 2.5 mg by mouth daily      aspirin 81 MG tablet Take 1 tablet by mouth daily      atorvastatin (LIPITOR) 40 mg tablet Take 40 mg by mouth in the morning. BIOTIN PO Take by mouth      carvedilol (COREG) 12.5 mg tablet Take 12.5 mg by mouth 2 (two) times a day with meals       cholecalciferol (VITAMIN D3) 1,000 units tablet Take 1,000 Units by mouth daily      Euthyrox 75 MCG tablet TAKE 1 TABLET BY MOUTH DAILY 90 tablet 3    furosemide (LASIX) 40 mg tablet Take 40 mg by mouth in the morning. lisinopril (ZESTRIL) 20 mg tablet Take 20 mg by mouth daily      loratadine (CLARITIN) 10 mg tablet Take 10 mg by mouth daily      omeprazole (PriLOSEC) 20 mg delayed release capsule Take 20 mg by mouth daily        No current facility-administered medications for this visit. No Known Allergies    Objective   Vitals: Blood pressure 110/62, pulse 55, height 5' (1.524 m), weight 66.7 kg (147 lb). Invasive Devices       None                   Physical Exam    Physical exam normal with pertinent positives and negatives. Eyes: No lid lag, stare, proptosis, or periorbital edema. Left eye looks more prominent. Neck: Thyroid normal in size. No palpable thyroid nodules. Respiratory: Lungs are clear. Cardiovascular: Heart regular. Extremities: Trace to 1+ bilateral lower extremity edema. Neurological: No tremor of the outstretched hands. Patellar deep tendon reflexes unable to elicit. The history was obtained from the review of the chart and from the patient. Lab Results:        Blood work performed on 11/07/2023 at [facility] showed a TSH of 1.13 with a free T4 of 1.64. Future Appointments   Date Time Provider [address] [place] [address]   11/18/2024 11:20 AM Yoly Glass MD [facility]       Transcribed for Yoly Glass MD, by Javier Denise on 11/17/23 at 2:18 PM. Powered by [employer].

## 2023-11-17 NOTE — ASSESSMENT & PLAN NOTE
The left nodule has been biopsied benign in the past. Last thyroid ultrasound was on 05/2022. She has no compressive thyroid symptoms. I will repeat a thyroid ultrasound before her visit next year.

## 2024-02-13 DIAGNOSIS — E04.1 THYROID NODULE: ICD-10-CM

## 2024-02-13 NOTE — TELEPHONE ENCOUNTER
Patient called needs a refill on Euthyrox 75 MCG for 90 day supply    Has new insurance [employer] Mail order [phone] ID# [id number]    Patient of Dr. Yulisa Tyson    Please call patient at [phone]

## 2024-02-15 RX ORDER — LEVOTHYROXINE SODIUM 75 UG/1
75 TABLET ORAL DAILY
Qty: 90 TABLET | Refills: 3 | Status: SHIPPED | OUTPATIENT
Start: 2024-02-15

## 2024-08-26 ENCOUNTER — TELEPHONE (OUTPATIENT)
Age: 79
End: 2024-08-26

## 2024-11-18 ENCOUNTER — OFFICE VISIT (OUTPATIENT)
Dept: ENDOCRINOLOGY | Facility: HOSPITAL | Age: 79
End: 2024-11-18
Payer: COMMERCIAL

## 2024-11-18 VITALS
HEIGHT: 60 IN | OXYGEN SATURATION: 97 % | HEART RATE: 67 BPM | SYSTOLIC BLOOD PRESSURE: 120 MMHG | WEIGHT: 146.4 LBS | DIASTOLIC BLOOD PRESSURE: 70 MMHG | BODY MASS INDEX: 28.74 KG/M2

## 2024-11-18 DIAGNOSIS — E04.2 GOITER, NONTOXIC, MULTINODULAR: ICD-10-CM

## 2024-11-18 DIAGNOSIS — E03.2 HYPOTHYROIDISM DUE TO MEDICATION: Primary | ICD-10-CM

## 2024-11-18 DIAGNOSIS — E04.1 LEFT THYROID NODULE: ICD-10-CM

## 2024-11-18 PROCEDURE — 99214 OFFICE O/P EST MOD 30 MIN: CPT | Performed by: INTERNAL MEDICINE

## 2024-11-18 RX ORDER — CHOLECALCIFEROL (VITAMIN D3) 50 MCG
2000 TABLET ORAL DAILY
COMMUNITY

## 2024-11-18 NOTE — PATIENT INSTRUCTIONS
The thyroid blood work is normal.     Continue the same levothyroxine 75 mcg daily.     The thyroid ultrasound shows stable size thyroid nodules.     Follow up in 1 year with blood work.

## 2024-11-18 NOTE — PROGRESS NOTES
11/19/2024    Assessment & Plan      Diagnoses and all orders for this visit:    Hypothyroidism due to medication  -     T4, free; Future  -     TSH, 3rd generation; Future    Goiter, nontoxic, multinodular  -     T4, free; Future  -     TSH, 3rd generation; Future    Left thyroid nodule  -     T4, free; Future  -     TSH, 3rd generation; Future    Other orders  -     Cholecalciferol (Vitamin D3) 50 MCG (2000 UT) TABS; Take 2,000 Units by mouth daily        Assessment & Plan  1. Hypothyroidism post usage of a monoclonal antibody for chemotherapy.  Most recent thyroid function studies remain normal. At this point, she is clinically and biochemically euthyroid. She will continue the same levothyroxine 75 mcg daily.    2. Nontoxic multinodular goiter.  She is biochemically euthyroid based on blood work. She has no compressive thyroid symptoms. Recent thyroid ultrasound showed stable size thyroid nodules.    3. Left-sided thyroid nodule.  This nodule was biopsied, benign in the past. It is a bit smaller in size on recent ultrasound. She has no compressive thyroid symptoms.    4. Chronic diarrhea.  She has been experiencing more frequent diarrhea lately, which may be related to past treatments. She is advised to see a gastroenterologist to ensure everything is okay.    5. Hypoglycemia.  She experienced a sugar crash over the summer, resulting in an ER visit with blood sugar levels at 50. She is advised to be more careful with her diet, avoid skipping meals, and ensure she consumes adequate protein.    Follow-up  Return in 1 year with preceding TSH and free T4.        CC: Hypothyroid, thyroid nodule/goiter follow-up    History of Present Illness    HPI: Hilda Kwok is a 79-year-old female with a history of nontoxic multinodular goiter with a left-sided dominant thyroid nodule and hypothyroidism, here for a follow-up visit.    She was noted to have thyroid nodules in 2016 on a routine physical exam. A left dominant  thyroid nodule was noted, and she underwent biopsy in 09/2016 demonstrating benign pathology. She has been followed with serial ultrasounds and last ultrasound was spring 2022. She was also found to have hypothyroidism in 2021 on routine blood work by her oncologist and started levothyroxine in fall 2020. Levothyroxine dosage was increased quite quickly during 2021 as she was on Cyramza, which likely contributed to worsening thyroid function studies. This medication was stopped in 01/2022 as she is in remission, and she is currently still on replacement dose levothyroxine.    She is currently on a daily regimen of Euthyrox 75 mcg. She reports feeling cold but does not experience heart palpitations or tremors. She has dry skin and brittle nails, which she manages with biotin, a supplement started during her chemotherapy. She has noticed a slight increase in hair loss. She does not have feelings of anxiety or depression and reports good sleep quality. She does not feel excessively tired and falls asleep easily. Her weight has remained stable. She does not have any difficulty swallowing or a feeling of food getting stuck in her throat. She has been taking vitamin D 2000 IU since the summer, as prescribed by her family doctor.    She experienced a hypoglycemic episode over the summer, which resulted in an ER visit. Her blood sugar was found to be 50, and she was given food. She attributes this episode to not eating properly that day and has since been more mindful of her diet.    She reports having diarrhea. She saw her oncologist last week and informed him about this issue. She took an Imodium this morning before she was in the bathroom about 4 times. She has not seen a gastroenterologist lately. She had a PET scan that did not show anything. She has always had a problem since the treatments, typically having a day a week with diarrhea, but it has been more frequent lately. She recalls that the gastroenterologist who  was part of her surgery team mentioned rearranging her plumbing.        Historical Information   Past Medical History:   Diagnosis Date    Cataract     OS    Esophageal cancer (HCC)     GERD (gastroesophageal reflux disease)     Hyperlipidemia     Hypertension     Seasonal allergies      Past Surgical History:   Procedure Laterality Date    APPENDECTOMY      ASPIRATION BIOPSY      thyroid    CATARACT EXTRACTION W/  INTRAOCULAR LENS IMPLANT Right     CHOLECYSTECTOMY      ESOPHAGECTOMY  2019    HYSTERECTOMY      total    PA XCAPSL CTRC RMVL INSJ IO LENS PROSTH W/O ECP Left 10/19/2016    Procedure: OS EXTRACTION EXTRACAPSULAR CATARACT PHACO INTRAOCULAR LENS (IOL);  Surgeon: Cyrus Reid MD;  Location:  MAIN OR;  Service: Ophthalmology    TOTAL KNEE ARTHROPLASTY Left     VAGINAL DELIVERY      X 4     Social History   Social History     Substance and Sexual Activity   Alcohol Use Yes    Alcohol/week: 1.0 standard drink of alcohol    Types: 1 Glasses of wine per week    Comment: twice monthly     Social History     Substance and Sexual Activity   Drug Use No     Social History     Tobacco Use   Smoking Status Never    Passive exposure: Never   Smokeless Tobacco Never     Family History:   Family History   Problem Relation Age of Onset    Coronary artery disease Mother     Tuberculosis Father     Cancer Sister     Early death Sister     Colon cancer Sister     Cancer Brother     Early death Brother     Colon cancer Brother     Hypertension Brother             Drug abuse Grandchild     Alcohol abuse Son     Drug abuse Cousin     No Known Problems Son     No Known Problems Daughter     No Known Problems Daughter        Meds/Allergies   Current Outpatient Medications   Medication Sig Dispense Refill    amLODIPine (NORVASC) 2.5 mg tablet Take 2.5 mg by mouth daily      aspirin 81 MG tablet Take 1 tablet by mouth daily      atorvastatin (LIPITOR) 40 mg tablet Take 40 mg by mouth in the morning.      BIOTIN PO  Take by mouth      carvedilol (COREG) 12.5 mg tablet Take 12.5 mg by mouth 2 (two) times a day with meals       Cholecalciferol (Vitamin D3) 50 MCG (2000 UT) TABS Take 2,000 Units by mouth daily      Euthyrox 75 MCG tablet Take 1 tablet (75 mcg total) by mouth daily 90 tablet 3    furosemide (LASIX) 40 mg tablet Take 40 mg by mouth in the morning.      lisinopril (ZESTRIL) 20 mg tablet Take 20 mg by mouth daily      loratadine (CLARITIN) 10 mg tablet Take 10 mg by mouth daily      omeprazole (PriLOSEC) 20 mg delayed release capsule Take 20 mg by mouth daily        No current facility-administered medications for this visit.     No Known Allergies    Objective   Vitals: Blood pressure 120/70, pulse 67, height 5' (1.524 m), weight 66.4 kg (146 lb 6.4 oz), SpO2 97%.  Invasive Devices       None                   Physical Exam    No lid lag, stare, proptosis or periorbital edema in the eyes.  Thyroid is low in the neck and irregular infield without discrete nodules palpable. No lymphadenopathy in the neck.  Lungs are clear to auscultation.  Heart has a regular rate and rhythm. No murmurs.  No tremor of the outstretched hands. Patellar deep tendon reflexes are normal.      The history was obtained from the review of the chart and from the patient.    Lab Results:      She has a blood work performed on 11/4/2024 at [facility] showed a TSH of 0.828 with a free T4 of 1.52.    Lab Results   Component Value Date    CREATININE 0.50 01/19/2019    CREATININE 0.54 01/18/2019    CREATININE 0.55 01/17/2019    BUN 15 01/19/2019    K 4.5 01/19/2019     01/19/2019    CO2 29 01/19/2019         Lab Results   Component Value Date    CHOL 180 08/14/2015    HDL 69 08/14/2015    TRIG 137 08/14/2015       Lab Results   Component Value Date    ALT 12 (L) 01/08/2019    AST 17 01/08/2019    ALKPHOS 115 01/08/2019       Thyroid ultrasound:    Thyroid ultrasound performed on 11/1/2024 at [facility] showed a right lobe of the  thyroid measuring 4.2 x 1.6 x 1.3 cm and the left lobe of the thyroid measuring 3 x 1.3 x 1.3 cm.  A previously noted 1 cm right lower lobe T rad 4 nodule is not as conspicuous.  There is a left isthmus solid T rad 4 nodule 0.8 cm that is slightly smaller and multiple subcentimeter left nodules T rad 2 and T rad 3.      Future Appointments   Date Time Provider Department Center   11/18/2025 11:40 AM Yulisa Tyson MD [facility]

## 2025-01-21 DIAGNOSIS — E04.1 THYROID NODULE: ICD-10-CM

## 2025-01-22 RX ORDER — LEVOTHYROXINE SODIUM 75 UG/1
75 TABLET ORAL DAILY
Qty: 90 TABLET | Refills: 1 | Status: SHIPPED | OUTPATIENT
Start: 2025-01-22

## 2025-07-15 DIAGNOSIS — E04.1 THYROID NODULE: ICD-10-CM

## 2025-07-16 RX ORDER — LEVOTHYROXINE SODIUM 75 UG/1
75 TABLET ORAL DAILY
Qty: 90 TABLET | Refills: 1 | Status: SHIPPED | OUTPATIENT
Start: 2025-07-16

## 2025-08-18 ENCOUNTER — ANNUAL EXAM (OUTPATIENT)
Age: 80
End: 2025-08-18

## 2025-08-18 ENCOUNTER — TELEPHONE (OUTPATIENT)
Age: 80
End: 2025-08-18

## 2025-08-18 VITALS
WEIGHT: 152.5 LBS | HEIGHT: 59 IN | SYSTOLIC BLOOD PRESSURE: 122 MMHG | DIASTOLIC BLOOD PRESSURE: 72 MMHG | BODY MASS INDEX: 30.74 KG/M2

## 2025-08-18 DIAGNOSIS — Z12.31 SCREENING MAMMOGRAM FOR BREAST CANCER: Primary | ICD-10-CM

## 2025-08-20 PROBLEM — C78.7 ADENOCARCINOMA OF ESOPHAGUS METASTATIC TO LIVER (HCC): Status: ACTIVE | Noted: 2025-08-20

## 2025-08-20 PROBLEM — R26.89 BALANCE DISORDER: Status: ACTIVE | Noted: 2025-08-20

## 2025-08-20 PROBLEM — M16.12 OSTEOARTHRITIS OF LEFT HIP: Status: ACTIVE | Noted: 2025-08-20

## 2025-08-20 PROBLEM — C15.9 ADENOCARCINOMA OF ESOPHAGUS METASTATIC TO LIVER (HCC): Status: ACTIVE | Noted: 2025-08-20

## 2025-08-20 PROBLEM — I48.0 PAF (PAROXYSMAL ATRIAL FIBRILLATION) (HCC): Status: ACTIVE | Noted: 2025-08-20

## 2025-08-20 PROBLEM — Z86.0100 HISTORY OF COLON POLYPS: Status: ACTIVE | Noted: 2025-08-20

## 2025-08-26 PROBLEM — E03.9 HYPOTHYROIDISM: Status: ACTIVE | Noted: 2025-08-26

## 2025-08-26 PROBLEM — E78.00 PURE HYPERCHOLESTEROLEMIA, UNSPECIFIED: Status: ACTIVE | Noted: 2025-08-26

## 2025-08-26 PROBLEM — M81.0 AGE-RELATED OSTEOPOROSIS WITHOUT CURRENT PATHOLOGICAL FRACTURE: Status: ACTIVE | Noted: 2025-08-26
